# Patient Record
Sex: FEMALE | Race: WHITE | NOT HISPANIC OR LATINO
[De-identification: names, ages, dates, MRNs, and addresses within clinical notes are randomized per-mention and may not be internally consistent; named-entity substitution may affect disease eponyms.]

---

## 2008-11-20 RX ORDER — QUETIAPINE FUMARATE 200 MG/1
1 TABLET, FILM COATED ORAL
Qty: 0 | Refills: 0 | COMMUNITY
Start: 2008-11-20

## 2008-11-20 RX ORDER — ALPRAZOLAM 0.25 MG
1 TABLET ORAL
Qty: 0 | Refills: 0 | COMMUNITY
Start: 2008-11-20

## 2013-11-20 RX ORDER — CLONAZEPAM 1 MG
0.5 TABLET ORAL
Qty: 0 | Refills: 0 | COMMUNITY
Start: 2013-11-20

## 2018-02-13 PROBLEM — Z00.00 ENCOUNTER FOR PREVENTIVE HEALTH EXAMINATION: Noted: 2018-02-13

## 2018-02-28 ENCOUNTER — APPOINTMENT (OUTPATIENT)
Dept: FAMILY MEDICINE | Facility: CLINIC | Age: 38
End: 2018-02-28
Payer: MEDICAID

## 2018-02-28 VITALS
WEIGHT: 139.31 LBS | DIASTOLIC BLOOD PRESSURE: 70 MMHG | RESPIRATION RATE: 16 BRPM | BODY MASS INDEX: 21.87 KG/M2 | SYSTOLIC BLOOD PRESSURE: 108 MMHG | HEIGHT: 67 IN | HEART RATE: 83 BPM | OXYGEN SATURATION: 97 %

## 2018-02-28 DIAGNOSIS — F41.8 OTHER SPECIFIED ANXIETY DISORDERS: ICD-10-CM

## 2018-02-28 DIAGNOSIS — M25.512 PAIN IN LEFT SHOULDER: ICD-10-CM

## 2018-02-28 PROCEDURE — 99204 OFFICE O/P NEW MOD 45 MIN: CPT

## 2018-02-28 RX ORDER — NORGESTIMATE AND ETHINYL ESTRADIOL 7DAYSX3 28
0.18/0.215/0.25 KIT ORAL
Qty: 28 | Refills: 0 | Status: DISCONTINUED | COMMUNITY
Start: 2018-01-17 | End: 2018-02-28

## 2018-03-01 PROBLEM — F41.8 SITUATIONAL ANXIETY: Status: ACTIVE | Noted: 2018-03-01

## 2018-03-02 ENCOUNTER — APPOINTMENT (OUTPATIENT)
Dept: FAMILY MEDICINE | Facility: CLINIC | Age: 38
End: 2018-03-02

## 2018-03-12 LAB
25(OH)D3 SERPL-MCNC: 47.5 NG/ML
ALBUMIN SERPL ELPH-MCNC: 4.4 G/DL
ALP BLD-CCNC: 46 U/L
ALT SERPL-CCNC: 14 U/L
ANION GAP SERPL CALC-SCNC: 18 MMOL/L
AST SERPL-CCNC: 29 U/L
BASOPHILS # BLD AUTO: 0.06 K/UL
BASOPHILS NFR BLD AUTO: 0.7 %
BILIRUB SERPL-MCNC: 0.5 MG/DL
BUN SERPL-MCNC: 18 MG/DL
CALCIUM SERPL-MCNC: 9.3 MG/DL
CHLORIDE SERPL-SCNC: 102 MMOL/L
CHOLEST SERPL-MCNC: 200 MG/DL
CHOLEST/HDLC SERPL: 3.5 RATIO
CO2 SERPL-SCNC: 24 MMOL/L
CREAT SERPL-MCNC: 0.95 MG/DL
DHEA SERPL-MCNC: 318 NG/DL
EOSINOPHIL # BLD AUTO: 0.64 K/UL
EOSINOPHIL NFR BLD AUTO: 7.4 %
FOLATE SERPL-MCNC: >20 NG/ML
GLUCOSE SERPL-MCNC: 92 MG/DL
HBA1C MFR BLD HPLC: 5.2 %
HCT VFR BLD CALC: 43.5 %
HDLC SERPL-MCNC: 57 MG/DL
HGB BLD-MCNC: 14.5 G/DL
IMM GRANULOCYTES NFR BLD AUTO: 0.2 %
LDLC SERPL CALC-MCNC: 134 MG/DL
LYMPHOCYTES # BLD AUTO: 3.17 K/UL
LYMPHOCYTES NFR BLD AUTO: 36.5 %
MAN DIFF?: NORMAL
MCHC RBC-ENTMCNC: 32.4 PG
MCHC RBC-ENTMCNC: 33.3 GM/DL
MCV RBC AUTO: 97.1 FL
MONOCYTES # BLD AUTO: 0.46 K/UL
MONOCYTES NFR BLD AUTO: 5.3 %
NEUTROPHILS # BLD AUTO: 4.34 K/UL
NEUTROPHILS NFR BLD AUTO: 49.9 %
PLATELET # BLD AUTO: 269 K/UL
POTASSIUM SERPL-SCNC: 4.3 MMOL/L
PROT SERPL-MCNC: 7 G/DL
RBC # BLD: 4.48 M/UL
RBC # FLD: 13.2 %
SODIUM SERPL-SCNC: 144 MMOL/L
T3FREE SERPL-MCNC: 2.48 PG/ML
T4 FREE SERPL-MCNC: 1.2 NG/DL
TESTOST BND SERPL-MCNC: 3.5 PG/ML
TESTOST SERPL-MCNC: 34.6 NG/DL
TRIGL SERPL-MCNC: 45 MG/DL
TSH SERPL-ACNC: 2.92 UIU/ML
VIT B12 SERPL-MCNC: 798 PG/ML
WBC # FLD AUTO: 8.69 K/UL

## 2018-03-14 ENCOUNTER — APPOINTMENT (OUTPATIENT)
Dept: FAMILY MEDICINE | Facility: CLINIC | Age: 38
End: 2018-03-14

## 2018-03-21 ENCOUNTER — APPOINTMENT (OUTPATIENT)
Dept: FAMILY MEDICINE | Facility: CLINIC | Age: 38
End: 2018-03-21

## 2018-03-28 ENCOUNTER — APPOINTMENT (OUTPATIENT)
Dept: FAMILY MEDICINE | Facility: CLINIC | Age: 38
End: 2018-03-28
Payer: MEDICAID

## 2018-03-28 VITALS
BODY MASS INDEX: 21.58 KG/M2 | RESPIRATION RATE: 14 BRPM | HEART RATE: 80 BPM | HEIGHT: 67 IN | OXYGEN SATURATION: 97 % | SYSTOLIC BLOOD PRESSURE: 104 MMHG | DIASTOLIC BLOOD PRESSURE: 72 MMHG | WEIGHT: 137.5 LBS

## 2018-03-28 DIAGNOSIS — M75.81 OTHER SHOULDER LESIONS, RIGHT SHOULDER: ICD-10-CM

## 2018-03-28 DIAGNOSIS — M25.511 PAIN IN RIGHT SHOULDER: ICD-10-CM

## 2018-03-28 PROCEDURE — 99214 OFFICE O/P EST MOD 30 MIN: CPT

## 2018-04-01 ENCOUNTER — OUTPATIENT (OUTPATIENT)
Dept: OUTPATIENT SERVICES | Facility: HOSPITAL | Age: 38
LOS: 1 days | End: 2018-04-01
Payer: MEDICAID

## 2018-04-01 DIAGNOSIS — M67.912 UNSPECIFIED DISORDER OF SYNOVIUM AND TENDON, LEFT SHOULDER: Chronic | ICD-10-CM

## 2018-04-01 PROCEDURE — G9001: CPT

## 2018-04-18 ENCOUNTER — APPOINTMENT (OUTPATIENT)
Dept: FAMILY MEDICINE | Facility: CLINIC | Age: 38
End: 2018-04-18

## 2018-04-19 DIAGNOSIS — R69 ILLNESS, UNSPECIFIED: ICD-10-CM

## 2018-05-09 ENCOUNTER — APPOINTMENT (OUTPATIENT)
Dept: FAMILY MEDICINE | Facility: CLINIC | Age: 38
End: 2018-05-09

## 2018-05-16 ENCOUNTER — RX RENEWAL (OUTPATIENT)
Age: 38
End: 2018-05-16

## 2018-06-04 ENCOUNTER — APPOINTMENT (OUTPATIENT)
Dept: FAMILY MEDICINE | Facility: CLINIC | Age: 38
End: 2018-06-04
Payer: MEDICAID

## 2018-06-04 VITALS
DIASTOLIC BLOOD PRESSURE: 80 MMHG | RESPIRATION RATE: 12 BRPM | HEIGHT: 67 IN | SYSTOLIC BLOOD PRESSURE: 130 MMHG | BODY MASS INDEX: 23.56 KG/M2 | HEART RATE: 76 BPM | OXYGEN SATURATION: 99 % | WEIGHT: 150.13 LBS

## 2018-06-04 DIAGNOSIS — N91.2 AMENORRHEA, UNSPECIFIED: ICD-10-CM

## 2018-06-04 PROCEDURE — 99214 OFFICE O/P EST MOD 30 MIN: CPT | Mod: 25

## 2018-06-04 PROCEDURE — 81025 URINE PREGNANCY TEST: CPT

## 2018-06-05 LAB
HCG UR QL: NEGATIVE
QUALITY CONTROL: YES

## 2018-06-07 NOTE — ASSESSMENT
[FreeTextEntry1] : medications renewed\par urine pregnancy test negative\par RTO prn and for f/u 3 months

## 2018-06-07 NOTE — PHYSICAL EXAM
[No Acute Distress] : no acute distress [Well Nourished] : well nourished [Well Developed] : well developed [Well-Appearing] : well-appearing [Normal Sclera/Conjunctiva] : normal sclera/conjunctiva [No Respiratory Distress] : no respiratory distress  [Clear to Auscultation] : lungs were clear to auscultation bilaterally [No Accessory Muscle Use] : no accessory muscle use [Normal Rate] : normal rate  [Regular Rhythm] : with a regular rhythm [Normal S1, S2] : normal S1 and S2
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
## 2018-07-20 ENCOUNTER — RX RENEWAL (OUTPATIENT)
Age: 38
End: 2018-07-20

## 2018-08-08 ENCOUNTER — APPOINTMENT (OUTPATIENT)
Dept: FAMILY MEDICINE | Facility: CLINIC | Age: 38
End: 2018-08-08

## 2018-08-08 ENCOUNTER — APPOINTMENT (OUTPATIENT)
Dept: FAMILY MEDICINE | Facility: CLINIC | Age: 38
End: 2018-08-08
Payer: MEDICAID

## 2018-08-08 VITALS
HEIGHT: 67 IN | BODY MASS INDEX: 20.51 KG/M2 | HEART RATE: 87 BPM | WEIGHT: 130.7 LBS | SYSTOLIC BLOOD PRESSURE: 111 MMHG | DIASTOLIC BLOOD PRESSURE: 80 MMHG

## 2018-08-08 DIAGNOSIS — E78.00 PURE HYPERCHOLESTEROLEMIA, UNSPECIFIED: ICD-10-CM

## 2018-08-08 PROCEDURE — 99214 OFFICE O/P EST MOD 30 MIN: CPT

## 2018-08-08 NOTE — HISTORY OF PRESENT ILLNESS
[FreeTextEntry1] : Patient presents for f/u appointment. Taking meds regularly (secretly from boyfriend, takes 25 mg quetiapine (breaks in half) nightly), Saw [facility] today, has started with new counselor in [place] whom she feels is helping her. Her 10 yo daughter is here from [facility], and will stay with her now. She is managing taking care of her daughter well, boyfriend and family helping with daughter. Feels better, however is anxious more often. Restarted her supplements; see below. \par \par ROS: negative except as noted above\par \par \par supplements: LIVIA 1500 mg daily, lithium orotate 240 mg qd, ashwagandha one daily, holy basil, chi'll out (chinese herb blend), b complex with zinc 25 mg, vit D, vit C 2000, mvi, calcium 1200, inositol 1000 mg, glycine 500 mg, hyaluronic acid 200 mg, echinacea 600 mg, queracetin 500 mg, NAC, bromelian 500-1500 mg, butterbur extract 50 mg, singing nettle 300 mg, reishi mushroom 900 mg, vit E 400 mg, ginko 125, dhea 100 mg, co Q 10 400 mg, iron 65 mg, iodine/potassium 12.5 mg, ALA 1200 mg, lysine 1000 mg, cayenne 1000 mg, garlic, collagen, L carnitine, raw probiotics, fish oil, turmeric, william

## 2018-08-08 NOTE — PHYSICAL EXAM
[No Acute Distress] : no acute distress [Well Nourished] : well nourished [Well Developed] : well developed [Well-Appearing] : well-appearing [Normal Voice/Communication] : normal voice/communication [No Respiratory Distress] : no respiratory distress  [Clear to Auscultation] : lungs were clear to auscultation bilaterally [No Accessory Muscle Use] : no accessory muscle use [Normal Rate] : normal rate  [Regular Rhythm] : with a regular rhythm [Normal S1, S2] : normal S1 and S2 [Normal Gait] : normal gait

## 2018-09-12 ENCOUNTER — RX RENEWAL (OUTPATIENT)
Age: 38
End: 2018-09-12

## 2018-09-24 ENCOUNTER — APPOINTMENT (OUTPATIENT)
Dept: FAMILY MEDICINE | Facility: CLINIC | Age: 38
End: 2018-09-24
Payer: MEDICAID

## 2018-09-24 VITALS
SYSTOLIC BLOOD PRESSURE: 100 MMHG | HEIGHT: 67 IN | BODY MASS INDEX: 21.03 KG/M2 | WEIGHT: 134 LBS | RESPIRATION RATE: 14 BRPM | TEMPERATURE: 98.3 F | DIASTOLIC BLOOD PRESSURE: 68 MMHG | OXYGEN SATURATION: 97 % | HEART RATE: 97 BPM

## 2018-09-24 DIAGNOSIS — B27.90 INFECTIOUS MONONUCLEOSIS, UNSPECIFIED W/OUT COMPLICATION: ICD-10-CM

## 2018-09-24 PROCEDURE — 99214 OFFICE O/P EST MOD 30 MIN: CPT

## 2018-09-24 NOTE — PHYSICAL EXAM
[No Acute Distress] : no acute distress [Well Nourished] : well nourished [Well Developed] : well developed [Normal Voice/Communication] : normal voice/communication [Normal Sclera/Conjunctiva] : normal sclera/conjunctiva [Supple] : supple [No Respiratory Distress] : no respiratory distress  [Clear to Auscultation] : lungs were clear to auscultation bilaterally [No Accessory Muscle Use] : no accessory muscle use [Normal Rate] : normal rate  [Regular Rhythm] : with a regular rhythm [Normal S1, S2] : normal S1 and S2 [Soft] : abdomen soft [Non-distended] : non-distended [No Masses] : no abdominal mass palpated [Normal Bowel Sounds] : normal bowel sounds [de-identified] : +mildly tender over left upper quadrant, no rebound, no guarding

## 2018-09-24 NOTE — ASSESSMENT
[FreeTextEntry1] : blood work reviewed with patient in office\par patient wants to return to holistic doctor for further vit C infusion\par increase fluids, increase rest, avoid contact sports, don't push yourself to exercise/exertion, make sure to rest\par no sick contacts at home; daughter living with her and boyfriend now, doing well\par RTO if you feel worse or do not feel better

## 2018-09-24 NOTE — HISTORY OF PRESENT ILLNESS
[FreeTextEntry8] : Went 9/20 for several  "pr[facility]" [facility] blood work, 8/27 blood work done. Exhausted. vit c drip, vit D, B 12 shot received in that office. Told she has mono. Was started on [place] thyroid by that doctor as well, started armour thyroid yesterday. \par \par armour thyroid 30 mg daily bid, last dose 3 pm; started yesterday

## 2018-10-01 ENCOUNTER — APPOINTMENT (OUTPATIENT)
Dept: ULTRASOUND IMAGING | Facility: HOSPITAL | Age: 38
End: 2018-10-01
Payer: MEDICAID

## 2018-10-01 ENCOUNTER — OUTPATIENT (OUTPATIENT)
Dept: OUTPATIENT SERVICES | Facility: HOSPITAL | Age: 38
LOS: 1 days | End: 2018-10-01
Payer: MEDICAID

## 2018-10-01 DIAGNOSIS — M67.912 UNSPECIFIED DISORDER OF SYNOVIUM AND TENDON, LEFT SHOULDER: Chronic | ICD-10-CM

## 2018-10-01 DIAGNOSIS — R10.11 RIGHT UPPER QUADRANT PAIN: ICD-10-CM

## 2018-10-01 PROCEDURE — 76700 US EXAM ABDOM COMPLETE: CPT | Mod: 26

## 2018-10-01 PROCEDURE — 76700 US EXAM ABDOM COMPLETE: CPT

## 2018-10-11 ENCOUNTER — RX RENEWAL (OUTPATIENT)
Age: 38
End: 2018-10-11

## 2018-10-19 RX ORDER — QUETIAPINE FUMARATE 50 MG/1
50 TABLET ORAL
Qty: 30 | Refills: 3 | Status: DISCONTINUED | COMMUNITY
Start: 2018-02-28 | End: 2018-10-19

## 2018-10-20 ENCOUNTER — EMERGENCY (EMERGENCY)
Facility: HOSPITAL | Age: 38
LOS: 1 days | Discharge: HOME | End: 2018-10-20
Attending: EMERGENCY MEDICINE | Admitting: EMERGENCY MEDICINE
Payer: MEDICAID

## 2018-10-20 VITALS
TEMPERATURE: 96 F | SYSTOLIC BLOOD PRESSURE: 91 MMHG | DIASTOLIC BLOOD PRESSURE: 60 MMHG | HEIGHT: 67 IN | WEIGHT: 130.07 LBS | HEART RATE: 75 BPM | RESPIRATION RATE: 16 BRPM

## 2018-10-20 VITALS
RESPIRATION RATE: 17 BRPM | SYSTOLIC BLOOD PRESSURE: 105 MMHG | OXYGEN SATURATION: 99 % | TEMPERATURE: 98 F | DIASTOLIC BLOOD PRESSURE: 66 MMHG | HEART RATE: 80 BPM

## 2018-10-20 DIAGNOSIS — R05 COUGH: ICD-10-CM

## 2018-10-20 DIAGNOSIS — M67.912 UNSPECIFIED DISORDER OF SYNOVIUM AND TENDON, LEFT SHOULDER: Chronic | ICD-10-CM

## 2018-10-20 LAB
ALBUMIN SERPL ELPH-MCNC: 3.7 G/DL — SIGNIFICANT CHANGE UP (ref 3.3–5)
ALP SERPL-CCNC: 45 U/L — SIGNIFICANT CHANGE UP (ref 40–120)
ALT FLD-CCNC: 21 U/L DA — SIGNIFICANT CHANGE UP (ref 10–45)
ANION GAP SERPL CALC-SCNC: 12 MMOL/L — SIGNIFICANT CHANGE UP (ref 5–17)
AST SERPL-CCNC: 17 U/L — SIGNIFICANT CHANGE UP (ref 10–40)
BILIRUB SERPL-MCNC: 0.3 MG/DL — SIGNIFICANT CHANGE UP (ref 0.2–1.2)
BUN SERPL-MCNC: 16 MG/DL — SIGNIFICANT CHANGE UP (ref 7–23)
CALCIUM SERPL-MCNC: 8.6 MG/DL — SIGNIFICANT CHANGE UP (ref 8.4–10.5)
CHLORIDE SERPL-SCNC: 104 MMOL/L — SIGNIFICANT CHANGE UP (ref 96–108)
CO2 SERPL-SCNC: 26 MMOL/L — SIGNIFICANT CHANGE UP (ref 22–31)
CREAT SERPL-MCNC: 0.83 MG/DL — SIGNIFICANT CHANGE UP (ref 0.5–1.3)
D DIMER BLD IA.RAPID-MCNC: <150 NG/ML DDU — SIGNIFICANT CHANGE UP
GLUCOSE SERPL-MCNC: 91 MG/DL — SIGNIFICANT CHANGE UP (ref 70–99)
HCT VFR BLD CALC: 43.4 % — SIGNIFICANT CHANGE UP (ref 34.5–45)
HGB BLD-MCNC: 14.8 G/DL — SIGNIFICANT CHANGE UP (ref 11.5–15.5)
INR BLD: 1 RATIO — SIGNIFICANT CHANGE UP (ref 0.88–1.16)
MCHC RBC-ENTMCNC: 33.5 PG — SIGNIFICANT CHANGE UP (ref 27–34)
MCHC RBC-ENTMCNC: 34.2 GM/DL — SIGNIFICANT CHANGE UP (ref 32–36)
MCV RBC AUTO: 98 FL — SIGNIFICANT CHANGE UP (ref 80–100)
PLATELET # BLD AUTO: 282 K/UL — SIGNIFICANT CHANGE UP (ref 150–400)
POTASSIUM SERPL-MCNC: 4.4 MMOL/L — SIGNIFICANT CHANGE UP (ref 3.5–5.3)
POTASSIUM SERPL-SCNC: 4.4 MMOL/L — SIGNIFICANT CHANGE UP (ref 3.5–5.3)
PROT SERPL-MCNC: 6.8 G/DL — SIGNIFICANT CHANGE UP (ref 6–8.3)
PROTHROM AB SERPL-ACNC: 11.1 SEC — SIGNIFICANT CHANGE UP (ref 9.8–12.7)
RBC # BLD: 4.43 M/UL — SIGNIFICANT CHANGE UP (ref 3.8–5.2)
RBC # FLD: 12.8 % — SIGNIFICANT CHANGE UP (ref 10.3–14.5)
SODIUM SERPL-SCNC: 142 MMOL/L — SIGNIFICANT CHANGE UP (ref 135–145)
TROPONIN I SERPL-MCNC: <.017 NG/ML — LOW (ref 0.02–0.06)
WBC # BLD: 10.1 K/UL — SIGNIFICANT CHANGE UP (ref 3.8–10.5)
WBC # FLD AUTO: 10.1 K/UL — SIGNIFICANT CHANGE UP (ref 3.8–10.5)

## 2018-10-20 PROCEDURE — 93005 ELECTROCARDIOGRAM TRACING: CPT

## 2018-10-20 PROCEDURE — 96360 HYDRATION IV INFUSION INIT: CPT

## 2018-10-20 PROCEDURE — 84484 ASSAY OF TROPONIN QUANT: CPT

## 2018-10-20 PROCEDURE — 85379 FIBRIN DEGRADATION QUANT: CPT

## 2018-10-20 PROCEDURE — 99285 EMERGENCY DEPT VISIT HI MDM: CPT

## 2018-10-20 PROCEDURE — 85027 COMPLETE CBC AUTOMATED: CPT

## 2018-10-20 PROCEDURE — 85610 PROTHROMBIN TIME: CPT

## 2018-10-20 PROCEDURE — 80053 COMPREHEN METABOLIC PANEL: CPT

## 2018-10-20 PROCEDURE — 93010 ELECTROCARDIOGRAM REPORT: CPT

## 2018-10-20 PROCEDURE — 71046 X-RAY EXAM CHEST 2 VIEWS: CPT

## 2018-10-20 PROCEDURE — 99284 EMERGENCY DEPT VISIT MOD MDM: CPT | Mod: 25

## 2018-10-20 PROCEDURE — 94640 AIRWAY INHALATION TREATMENT: CPT

## 2018-10-20 PROCEDURE — 71046 X-RAY EXAM CHEST 2 VIEWS: CPT | Mod: 26

## 2018-10-20 RX ORDER — SODIUM CHLORIDE 9 MG/ML
1000 INJECTION INTRAMUSCULAR; INTRAVENOUS; SUBCUTANEOUS ONCE
Qty: 0 | Refills: 0 | Status: COMPLETED | OUTPATIENT
Start: 2018-10-20 | End: 2018-10-20

## 2018-10-20 RX ORDER — AZITHROMYCIN 500 MG/1
500 TABLET, FILM COATED ORAL ONCE
Qty: 0 | Refills: 0 | Status: COMPLETED | OUTPATIENT
Start: 2018-10-20 | End: 2018-10-20

## 2018-10-20 RX ORDER — ALBUTEROL 90 UG/1
2 AEROSOL, METERED ORAL
Qty: 1 | Refills: 0 | OUTPATIENT
Start: 2018-10-20 | End: 2018-10-26

## 2018-10-20 RX ORDER — AZITHROMYCIN 500 MG/1
1 TABLET, FILM COATED ORAL
Qty: 4 | Refills: 0 | OUTPATIENT
Start: 2018-10-20 | End: 2018-10-23

## 2018-10-20 RX ORDER — IPRATROPIUM/ALBUTEROL SULFATE 18-103MCG
3 AEROSOL WITH ADAPTER (GRAM) INHALATION ONCE
Qty: 0 | Refills: 0 | Status: COMPLETED | OUTPATIENT
Start: 2018-10-20 | End: 2018-10-20

## 2018-10-20 RX ADMIN — SODIUM CHLORIDE 1000 MILLILITER(S): 9 INJECTION INTRAMUSCULAR; INTRAVENOUS; SUBCUTANEOUS at 20:48

## 2018-10-20 RX ADMIN — Medication 3 MILLILITER(S): at 19:50

## 2018-10-20 RX ADMIN — AZITHROMYCIN 500 MILLIGRAM(S): 500 TABLET, FILM COATED ORAL at 20:13

## 2018-10-20 RX ADMIN — SODIUM CHLORIDE 2000 MILLILITER(S): 9 INJECTION INTRAMUSCULAR; INTRAVENOUS; SUBCUTANEOUS at 19:50

## 2018-10-20 NOTE — ED PROVIDER NOTE - PLAN OF CARE
Follow up with your PMD within 48-72 hours.  Take copies of your reports given to you. Rest, increase fluids. Take Tylenol 650mg every 4-6 hours for pain or temp greater than 99.9. Take Zithromax 250mg daily for 4 more days. Albuterol 2 inhalations every 4-6 hours as needed for cough. Worsening, continued or ANY new concerning symptoms return to the emergency department.

## 2018-10-20 NOTE — ED PROVIDER NOTE - ATTENDING CONTRIBUTION TO CARE
Dr. Granados: I performed a face to face bedside interview with patient regarding history of present illness, review of symptoms and past medical history. I completed an independent physical exam.  I have discussed patient's plan of care with PA.   I agree with note as stated above, having amended the EMR as needed to reflect my findings.   This includes HISTORY OF PRESENT ILLNESS, HIV, PAST MEDICAL/SURGICAL/FAMILY/SOCIAL HISTORY, ALLERGIES AND HOME MEDICATIONS, REVIEW OF SYSTEMS, PHYSICAL EXAM, and any PROGRESS NOTES during the time I functioned as the attending physician for this patient.  VITALS: reviewed  GEN: NAD, A & O x 4  HEAD/EYES: NCAT, PERRL, EOMI, anicteric sclerae, no conjunctival pallor  ENT: mucus membranes moist, oropharynx WNL, trachea midline, no JVD  CHEST: lungs with exp wheezing and bibasilar crackles  CV: heart with reg rhythm S1, S2, no murmur; distal pulses intact and symmetric bilaterally  ABDOMEN: normoactive bowel sounds, soft, ND, nontender, no masses  : no CVAT, no suprapubic tenderness or fullness  MSK: extremities atraumatic and nontender, no edema. back is without midline tenderness, deformity or stepoff and is ranged painlessly. the neck has no midline tenderness, deformity, or stepoff, and is ranged painlessly.  SKIN: no rash, no bruising, no cyanosis. color appropriate for ethnicity  NEURO: alert, mentating appropriately, no facial asymmetry. gross sensation, motor, coordination are intact  PSYCH: Affect appropriate

## 2018-10-20 NOTE — ED ADULT NURSE NOTE - NSIMPLEMENTINTERV_GEN_ALL_ED
Implemented All Fall with Harm Risk Interventions:  [place] to call system. Call bell, personal items and telephone within reach. Instruct patient to call for assistance. Room bathroom lighting operational. Non-slip footwear when patient is off stretcher. Physically safe environment: no spills, clutter or unnecessary equipment. Stretcher in lowest position, wheels locked, appropriate side rails in place. Provide visual cue, wrist band, yellow gown, etc. Monitor gait and stability. Monitor for mental status changes and reorient to person, place, and time. Review medications for side effects contributing to fall risk. Reinforce activity limits and safety measures with patient and family. Provide visual clues: red socks.

## 2018-10-20 NOTE — ED PROVIDER NOTE - CARE PLAN
Principal Discharge DX:	Bronchitis  Assessment and plan of treatment:	Follow up with your PMD within 48-72 hours.  Take copies of your reports given to you. Rest, increase fluids. Take Tylenol 650mg every 4-6 hours for pain or temp greater than 99.9. Take Zithromax 250mg daily for 4 more days. Albuterol 2 inhalations every 4-6 hours as needed for cough. Worsening, continued or ANY new concerning symptoms return to the emergency department.  Secondary Diagnosis:	Near syncope

## 2018-10-20 NOTE — ED PROVIDER NOTE - MEDICAL DECISION MAKING DETAILS
37 y/o F diagnosed with Mono 8/27 presents with cough, fever x 2 weeks (fever resolved) and now shortness of breath, chest pressure, palpiation x 4 days since starting a growth factor daily injections with near syncope episode last night- EKG, Labs with Trop, CXR, Fluids, reassess 39 y/o F diagnosed with Mono 8/27 presents with cough, fever x 2 weeks (fever resolved) and now shortness of breath, chest pressure, palpitations x 4 days since starting a growth factor daily injections with near syncope episode last night- EKG, Labs with Trop, CXR, Fluids, reassess

## 2018-10-20 NOTE — ED PROVIDER NOTE - PROGRESS NOTE DETAILS
EKG WNL, BW WNL, UCG negative, CXR- NAPF. Feeling better s/p neb/ Will DC home on Zithromax/Albuterol with PMD follow up. Discussed stopping growth hormone injections. Copies of all reports given to pt.

## 2018-10-20 NOTE — ED ADULT NURSE NOTE - OBJECTIVE STATEMENT
pt complaining of cough and fever for 2 weeks. pt reports she started taking IGF 4 days ago and started feeling palpitations, chest pressure and sob. pt reports generalized weakness

## 2018-10-20 NOTE — ED PROVIDER NOTE - OBJECTIVE STATEMENT
39 y/o F no pmh presents with cough, fever x 2 weeks and near syncope episode last night.  States she was diagnosed with Mononucleosis 8/27 by PMD [facility]. States fatigue and generalized weakness since. States 2 weeks ago started with cough and fever Tmax 101 that broke 2 days ago but left with cough. Also states she started IGF1 (growth factor) 4 days ago. Since then states chest pressure, palpitations and shortness of breath. While walking with  last night states she had a near syncope episode- did not take the growth hormone factor last night. Currently appears well, speaking in full sentences, no distress.

## 2018-10-22 NOTE — ED POST DISCHARGE NOTE - RESULT SUMMARY
Pt called stating that she received a call from someone in the ED a few hours ago stating that her pregnancy test was positive. after reviewing the chart flowsheet states pregnancy test is negative and no post discharge note not entered. lengthy discussion with pt regarding negative results

## 2018-10-25 RX ORDER — FLUTICASONE PROPIONATE 110 UG/1
110 AEROSOL, METERED RESPIRATORY (INHALATION) TWICE DAILY
Qty: 1 | Refills: 2 | Status: ACTIVE | COMMUNITY
Start: 2018-10-25 | End: 1900-01-01

## 2018-11-07 ENCOUNTER — APPOINTMENT (OUTPATIENT)
Dept: FAMILY MEDICINE | Facility: CLINIC | Age: 38
End: 2018-11-07

## 2018-11-20 ENCOUNTER — EMERGENCY (EMERGENCY)
Facility: HOSPITAL | Age: 38
LOS: 1 days | Discharge: HOME | End: 2018-11-20
Attending: EMERGENCY MEDICINE
Payer: MEDICAID

## 2018-11-20 VITALS
RESPIRATION RATE: 18 BRPM | SYSTOLIC BLOOD PRESSURE: 103 MMHG | DIASTOLIC BLOOD PRESSURE: 72 MMHG | OXYGEN SATURATION: 98 % | TEMPERATURE: 99 F | HEART RATE: 96 BPM

## 2018-11-20 VITALS — WEIGHT: 134.92 LBS | HEIGHT: 67 IN

## 2018-11-20 DIAGNOSIS — M67.912 UNSPECIFIED DISORDER OF SYNOVIUM AND TENDON, LEFT SHOULDER: Chronic | ICD-10-CM

## 2018-11-20 DIAGNOSIS — F31.9 BIPOLAR DISORDER, UNSPECIFIED: ICD-10-CM

## 2018-11-20 DIAGNOSIS — F10.10 ALCOHOL ABUSE, UNCOMPLICATED: ICD-10-CM

## 2018-11-20 LAB
ALBUMIN SERPL ELPH-MCNC: 4.4 G/DL — SIGNIFICANT CHANGE UP (ref 3.3–5.2)
ALP SERPL-CCNC: 63 U/L — SIGNIFICANT CHANGE UP (ref 40–120)
ALT FLD-CCNC: 13 U/L — SIGNIFICANT CHANGE UP
AMPHET UR-MCNC: NEGATIVE — SIGNIFICANT CHANGE UP
ANION GAP SERPL CALC-SCNC: 13 MMOL/L — SIGNIFICANT CHANGE UP (ref 5–17)
APPEARANCE UR: CLEAR — SIGNIFICANT CHANGE UP
AST SERPL-CCNC: 22 U/L — SIGNIFICANT CHANGE UP
BARBITURATES UR SCN-MCNC: NEGATIVE — SIGNIFICANT CHANGE UP
BASOPHILS # BLD AUTO: 0.1 K/UL — SIGNIFICANT CHANGE UP (ref 0–0.2)
BASOPHILS NFR BLD AUTO: 0.8 % — SIGNIFICANT CHANGE UP (ref 0–2)
BENZODIAZ UR-MCNC: POSITIVE
BILIRUB SERPL-MCNC: <0.2 MG/DL — LOW (ref 0.4–2)
BILIRUB UR-MCNC: NEGATIVE — SIGNIFICANT CHANGE UP
BUN SERPL-MCNC: 24 MG/DL — HIGH (ref 8–20)
CALCIUM SERPL-MCNC: 8.3 MG/DL — LOW (ref 8.6–10.2)
CHLORIDE SERPL-SCNC: 104 MMOL/L — SIGNIFICANT CHANGE UP (ref 98–107)
CO2 SERPL-SCNC: 23 MMOL/L — SIGNIFICANT CHANGE UP (ref 22–29)
COCAINE METAB.OTHER UR-MCNC: NEGATIVE — SIGNIFICANT CHANGE UP
COLOR SPEC: YELLOW — SIGNIFICANT CHANGE UP
CREAT SERPL-MCNC: 0.68 MG/DL — SIGNIFICANT CHANGE UP (ref 0.5–1.3)
DIFF PNL FLD: NEGATIVE — SIGNIFICANT CHANGE UP
EOSINOPHIL # BLD AUTO: 0.2 K/UL — SIGNIFICANT CHANGE UP (ref 0–0.5)
EOSINOPHIL NFR BLD AUTO: 1.8 % — SIGNIFICANT CHANGE UP (ref 0–6)
ETHANOL SERPL-MCNC: 263 MG/DL — SIGNIFICANT CHANGE UP
GLUCOSE SERPL-MCNC: 95 MG/DL — SIGNIFICANT CHANGE UP (ref 70–115)
GLUCOSE UR QL: NEGATIVE MG/DL — SIGNIFICANT CHANGE UP
HCG UR QL: NEGATIVE — SIGNIFICANT CHANGE UP
HCT VFR BLD CALC: 43.1 % — SIGNIFICANT CHANGE UP (ref 37–47)
HGB BLD-MCNC: 14.5 G/DL — SIGNIFICANT CHANGE UP (ref 12–16)
KETONES UR-MCNC: NEGATIVE — SIGNIFICANT CHANGE UP
LEUKOCYTE ESTERASE UR-ACNC: NEGATIVE — SIGNIFICANT CHANGE UP
LYMPHOCYTES # BLD AUTO: 4.1 K/UL — SIGNIFICANT CHANGE UP (ref 1–4.8)
LYMPHOCYTES # BLD AUTO: 46.3 % — SIGNIFICANT CHANGE UP (ref 20–55)
MCHC RBC-ENTMCNC: 33 PG — HIGH (ref 27–31)
MCHC RBC-ENTMCNC: 33.6 G/DL — SIGNIFICANT CHANGE UP (ref 32–36)
MCV RBC AUTO: 98.2 FL — SIGNIFICANT CHANGE UP (ref 81–99)
METHADONE UR-MCNC: NEGATIVE — SIGNIFICANT CHANGE UP
MONOCYTES # BLD AUTO: 0.4 K/UL — SIGNIFICANT CHANGE UP (ref 0–0.8)
MONOCYTES NFR BLD AUTO: 4.5 % — SIGNIFICANT CHANGE UP (ref 3–10)
NEUTROPHILS # BLD AUTO: 4.1 K/UL — SIGNIFICANT CHANGE UP (ref 1.8–8)
NEUTROPHILS NFR BLD AUTO: 46.1 % — SIGNIFICANT CHANGE UP (ref 37–73)
NITRITE UR-MCNC: NEGATIVE — SIGNIFICANT CHANGE UP
OPIATES UR-MCNC: NEGATIVE — SIGNIFICANT CHANGE UP
PCP SPEC-MCNC: SIGNIFICANT CHANGE UP
PCP UR-MCNC: NEGATIVE — SIGNIFICANT CHANGE UP
PH UR: 5 — SIGNIFICANT CHANGE UP (ref 5–8)
PLATELET # BLD AUTO: 247 K/UL — SIGNIFICANT CHANGE UP (ref 150–400)
POTASSIUM SERPL-MCNC: 4.3 MMOL/L — SIGNIFICANT CHANGE UP (ref 3.5–5.3)
POTASSIUM SERPL-SCNC: 4.3 MMOL/L — SIGNIFICANT CHANGE UP (ref 3.5–5.3)
PROT SERPL-MCNC: 7.1 G/DL — SIGNIFICANT CHANGE UP (ref 6.6–8.7)
PROT UR-MCNC: NEGATIVE MG/DL — SIGNIFICANT CHANGE UP
RBC # BLD: 4.39 M/UL — LOW (ref 4.4–5.2)
RBC # FLD: 13.6 % — SIGNIFICANT CHANGE UP (ref 11–15.6)
SODIUM SERPL-SCNC: 140 MMOL/L — SIGNIFICANT CHANGE UP (ref 135–145)
SP GR SPEC: 1.01 — SIGNIFICANT CHANGE UP (ref 1.01–1.02)
THC UR QL: NEGATIVE — SIGNIFICANT CHANGE UP
UROBILINOGEN FLD QL: NEGATIVE MG/DL — SIGNIFICANT CHANGE UP
WBC # BLD: 8.9 K/UL — SIGNIFICANT CHANGE UP (ref 4.8–10.8)
WBC # FLD AUTO: 8.9 K/UL — SIGNIFICANT CHANGE UP (ref 4.8–10.8)

## 2018-11-20 PROCEDURE — 81003 URINALYSIS AUTO W/O SCOPE: CPT

## 2018-11-20 PROCEDURE — 99284 EMERGENCY DEPT VISIT MOD MDM: CPT

## 2018-11-20 PROCEDURE — 81025 URINE PREGNANCY TEST: CPT

## 2018-11-20 PROCEDURE — 93005 ELECTROCARDIOGRAM TRACING: CPT

## 2018-11-20 PROCEDURE — 85027 COMPLETE CBC AUTOMATED: CPT

## 2018-11-20 PROCEDURE — 90792 PSYCH DIAG EVAL W/MED SRVCS: CPT

## 2018-11-20 PROCEDURE — 93010 ELECTROCARDIOGRAM REPORT: CPT

## 2018-11-20 PROCEDURE — 80053 COMPREHEN METABOLIC PANEL: CPT

## 2018-11-20 PROCEDURE — 36415 COLL VENOUS BLD VENIPUNCTURE: CPT

## 2018-11-20 PROCEDURE — 80307 DRUG TEST PRSMV CHEM ANLYZR: CPT

## 2018-11-20 NOTE — ED BEHAVIORAL HEALTH ASSESSMENT NOTE - DETAILS
na mother-depression aunt anxiety no family suicide case closed, called when mother sent daughter to school with allergy meds, teachers did not know what it was mother

## 2018-11-20 NOTE — ED BEHAVIORAL HEALTH ASSESSMENT NOTE - HPI (INCLUDE ILLNESS QUALITY, SEVERITY, DURATION, TIMING, CONTEXT, MODIFYING FACTORS, ASSOCIATED SIGNS AND SYMPTOMS)
38 year old woman, hx of bipolar disorder, no prior suicide attempts 3 prior psychiatric hospitalizations year [facility] , recently broke up with ana, has 10 yo daughter, works as , BIB parents due to excessive intoxication.  Patient reports sadness in context of being dumped by fidenise, however denies she was sad before this, states she is suffering from a "broken heart". She states she started drinking to self medicate, at times drinks up to half a bottle of alcohol , endorses withdrawal sx of waking up sweating in middle of night at times. She denies suicidal ideation and homicidal ideation. She denies AH, VH and paranoia. She endorses hx of manic episodes lasting days with elevated mood racing thoughts increased spending, decreased sleep, last occurring 1.5 years ago.  patient states "I know I need to drink less" states she is not particularly interested in directly checking into rehab, that it would have to be a high end rehab. Also not particularly interested in attending substance abuse groups.     Mother called [phone] Mother states patient has been depressed for the past month, lays in bed is depressed, doors and blinds are closed, is drinking excessively. Mother states she found 12 to 20 alcohol bottles lying around, Mother states 3 days ago patient was highly intoxicated to the point of falling off the chair was too drunk to even feed herself. Mother states yesterday patient fell off the chair, and fell out of bed 3 times. Mother states patient’s therapist brought patient to the hospital because she is “endangering herself”. Mother states patient has reported at times doubling up her medications to treat her distress. Mother states “we don’t know what to do”.

## 2018-11-20 NOTE — ED BEHAVIORAL HEALTH ASSESSMENT NOTE - OTHER
"sad" advised patient it can be fatal to mix benzodiazepines and ETOH, patient expressed understanding.

## 2018-11-20 NOTE — ED BEHAVIORAL HEALTH ASSESSMENT NOTE - NS ED BHA PLAN TR BH CONTACTED FT
Dr Shilpa lorenzana, Nabila Little; discussed patient's presentation  and recommendations for substance treatment with Dr. Lorenzana who agreed, left message with Nabila Little

## 2018-11-20 NOTE — ED STATDOCS - OBJECTIVE STATEMENT
39 y/o F pt with PMHx bipolar disorder presents to the ED with parents for drinking too much.  Pt notes her parents brought her into the ED for "drinking too much" for the past 3 weeks.  Pt admits to drinking because of a recent breakup, "drinking to ease the pain".  Per mother, pt has been drinking "bottles and bottles".  Last EtOH intake last night.  Pt spoke to therapist on the phone last week, and saw psychiatrist several weeks ago.  Pt has admitted herself to psychiatric facilities 4 times, last admission 2013.  Pt is currently being weened off xanax and being placed on clonopin.  Pt admits to vaping.  Denies SI.  No further acute complaints at this time.   Psychiatrist: Dr. Quintanilla  Therapist: Dr. Brown 37 y/o F pt with PMHx bipolar disorder presents to the ED with parents for drinking too much.  Pt notes her parents brought her into the ED for "drinking too much" for the past 3 weeks.  Pt admits to drinking because of a recent breakup, "drinking to ease the pain".  Per mother, pt has been drinking "bottles and bottles".  Last EtOH intake last night.  Pt spoke to therapist on the phone last week, and saw psychiatrist several weeks ago.  Pt has admitted herself to psychiatric facilities 4 times, last admission 2013.  Pt is currently being weened off xanax and being placed on clonopin.  Pt admits to vaping.  Denies SI.  No further acute complaints at this time.   Psychiatrist: Dr. Champion  Therapist: Dr. Brown 39 y/o F pt with PMHx bipolar disorder presents to the ED with parents for drinking too much.  Pt notes her parents brought her into the ED for "drinking too much" for the past 3 weeks.  Pt admits to drinking because of a recent breakup, "drinking to ease the pain".  Denies SI/HI. Per mother, pt has been drinking "bottles and bottles".  Last EtOH intake last night.  Pt spoke to therapist on the phone last week, and saw psychiatrist several weeks ago.  Pt has admitted herself to psychiatric facilities 4 times, last admission 2013.  Pt is currently being weened off xanax and being placed on clonopin.  Pt admits to vaping.  Denies SI.  No further acute complaints at this time.   Psychiatrist: Dr. Lorenzana  Therapist: Nabila Rodrigues

## 2018-11-20 NOTE — ED ADULT NURSE NOTE - NSIMPLEMENTINTERV_GEN_ALL_ED
Implemented All Universal Safety Interventions:  [place] to call system. Call bell, personal items and telephone within reach. Instruct patient to call for assistance. Room bathroom lighting operational. Non-slip footwear when patient is off stretcher. Physically safe environment: no spills, clutter or unnecessary equipment. Stretcher in lowest position, wheels locked, appropriate side rails in place.

## 2018-11-20 NOTE — ED STATDOCS - PROGRESS NOTE DETAILS
Case discussed with BH for pt's evaluation. BH evaluation complete, patient cleared for discharge, outpatient referrals made and explained to patient.

## 2018-11-20 NOTE — ED BEHAVIORAL HEALTH ASSESSMENT NOTE - RISK ASSESSMENT
Chronic risk due to hx of bipolar disorder and substance abuse. Acute risk due to excessive drinking and breakup. however has no personal or family hx of suicide attempts denies suicidal ideation and homicidal ideation, future oriented with plans to look for jobs, has dependent daughter, no guns in home, positive therapeutic alliance. Patient assessed to not be at imminent risk of harm to self or others.
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
## 2018-11-20 NOTE — ED BEHAVIORAL HEALTH ASSESSMENT NOTE - SUMMARY
2018      Ryan Mars MD    [facility]   [address] [place] [address]      RE: Lin Arce   MRN: [id number]   : 1957      Dear Dr. Mars:      We had the pleasure of seeing Ms. Lin Arce in our [facility] today for followup.  She is a pleasant 60-year-old female with pulmonary arterial hypertension secondary to her systemic lupus erythematosus, who is currently being managed with up-front combination therapy with Adcirca and Letairis.  She is on 40 mg of Adcirca and 10 mg of Letairis.      She returns for her followup. Ms. Arce has been feeling significantly better.  She is not having much exertional shortness of breath.  She was able to do a lot of cleaning activities at home this summer.  I would currently characterize her as NYHA functional class II.  She denies having any chest pain or pressure.  She has not had any lower extremity swelling.  She has no lightheadedness, dizziness or syncope.  She has no chest pain or pressure.  She has no PND or orthopnea.      CURRENT MEDICATIONS:   Current Outpatient Prescriptions   Medication Sig     ambrisentan (LETAIRIS) 10 MG tablet Take 1 tablet (10 mg) by mouth daily     Cetirizine HCl (ZYRTEC ALLERGY PO) Take by mouth daily     digoxin (LANOXIN) 125 MCG tablet TAKE ONE TABLET BY MOUTH ONCE DAILY     diphenhydrAMINE-acetaminophen (TYLENOL PM)  MG tablet Take 2 tablets by mouth At Bedtime     FLUoxetine (PROZAC) 20 MG capsule TAKE ONE CAPSULE BY MOUTH ONCE DAILY     folic acid (FOLVITE) 1 MG tablet Take 1 tablet (1 mg) by mouth daily     Folic Acid-B6-B12-D (RX ESSENTIALS ANTIDEPRESSANT PO)      furosemide (LASIX) 20 MG tablet Take 1 tablet (20 mg) by mouth as needed     magnesium oxide (MAG-OX) 400 MG tablet Take 1 tablet (400 mg) by mouth 2 times daily     potassium chloride (K-TAB,KLOR-CON) 10 MEQ tablet TAKE ONE TABLET BY MOUTH ONCE DAILY      tadalafil, PAH, (ADCIRCA) 20 MG TABS Take 2 tablets (40 mg) by mouth daily     TraMADol HCl (ULTRAM PO) Take 100 mg by mouth every 6 hours as needed for moderate to severe pain     zolpidem (AMBIEN) 5 MG tablet Take 1 tablet (5 mg) by mouth nightly as needed for sleep     No current facility-administered medications for this visit.         REVIEW OF SYSTEMS:  A detailed 10-point review of systems obtained as described in the History of Present Illness. All other systems were reviewed and are negative.      PHYSICAL EXAMINATION:  She was comfortable.  She was in no apparent distress.  /66 (BP Location: Left arm, Patient Position: Chair, Cuff Size: Adult Large)  Pulse 63  Ht 1.524 m (5')  Wt 77.7 kg (171 lb 3.2 oz)  SpO2 98%  BMI 33.44 kg/m2. She had no pallor, cyanosis or jaundice.  Neck exam revealed no JVD, thyromegaly or carotid bruit.  Cardiac auscultation revealed normal S1 and S2 with no murmur, rub or gallop.  Auscultation of the lungs revealed equal air entry on both sides with no added sounds.  Her abdomen was soft with normal bowel sounds, no tenderness, no rigidity, no guarding.  She had no focal neurological deficit.      She had a 6MWT today where she walked for 305 meters. This is better than lst year but less than her baseline. She had no significant desaturations.      NT-proBNP - 333    CBC RESULTS:   Recent Labs   Lab Test  07/16/18   1004   WBC  4.4   RBC  3.67*   HGB  11.3*   HCT  36.5   MCV  100   MCH  30.8   MCHC  31.0*   RDW  15.9*   PLT  140*     Recent Labs   Lab Test  07/16/18   1004  08/02/17   1122   NA  140  138   POTASSIUM  4.4  4.2   CHLORIDE  106  106   CO2  28  26   ANIONGAP  6  6   GLC  86  70   BUN  21  19   CR  0.97  0.73   SONDRA  8.7  8.7     Liver Function Studies -   Recent Labs   Lab Test  07/16/18   1004   PROTTOTAL  8.0   ALBUMIN  3.5   BILITOTAL  0.5   ALKPHOS  92   AST  27   ALT  14       Assessment and Plan:     In summary, Ms. Lin Arce is a very pleasant  58-year-old female with pulmonary arterial hypertension associated with systemic lupus erythematosus.  She is currently on up-front combination therapy with ambrisentan and Adcirca.      I am delighted to say that Ms. Huddleston is doing very well from a pulmonary arterial hypertension perspective.  She is currently NYHA functional class II.  She has no evidence of right heart failure on exam.  Her 6MWD is stable. She has no significant desaturations.     I did not make any changes.  We will continue her on Letairis at 10 mg and Adcirca 40 mg daily.  We will continue her on digoxin.  She takes Lasix as needed.       She will return to clinic in 6 months with n echo, 6MWT, and labs.      It was a pleasure meeting Ms. Lin Huddleston in Pulmonary Hypertension Clinic.  We thank you for involving us in her care.  Please do not hesitate to call us in the interim if you have any further questions.      Sincerely,   Ronn Brody MD   [facility]  Heart Failure, Transplant, and Mechanical Circulatory Support Cardiology   Cardiovascular Division  [facility]   [phone]         cc:   Nelson Mcgraw MD    [facility] [place] [address]         RONN BRODY MD             D: 2016 18:34   T: 2016 11:36   MT: VANESSA      Name:     LIN HUDDLESTON   MRN:      [id number]        Account:      [id number]   :      1957           Service Date: 2016      Document: [id number]     38 year old , hx of bipolar admitted for excessive intoxication, heavily drinking for weeks in context of breakup with fiance  recommended for patient to enter substance treatment, patient not interested at this time but requesting list of resources  pt will continue to see therapist Nabila Rodrigues weekly, second therapist Thanh Caldwell monthly, and psychiatrist Dr Lorenzana  patient given extensive list of local substance treatment options by BREE

## 2018-11-20 NOTE — ED BEHAVIORAL HEALTH ASSESSMENT NOTE - DESCRIPTION
calm  Vital Signs Last 24 Hrs  T(C): 37.1 (20 Nov 2018 15:10), Max: 37.1 (20 Nov 2018 15:10)  T(F): 98.8 (20 Nov 2018 15:10), Max: 98.8 (20 Nov 2018 15:10)  HR: 96 (20 Nov 2018 15:10) (85 - 96)  BP: 103/72 (20 Nov 2018 15:10) (103/72 - 122/89)  BP(mean): --  RR: 18 (20 Nov 2018 15:10) (18 - 18)  SpO2: 98% (20 Nov 2018 15:10) (93% - 98%) none  has 10 yo daughter, reports today was her final chance to show up to work or else she would be fired, has been on medical leave for months due to getting EBV in August

## 2018-11-20 NOTE — ED BEHAVIORAL HEALTH ASSESSMENT NOTE - REFERRAL / APPOINTMENT DETAILS
pt will continue to see therapist Nabila Rodrigues weekly, second therapist Thanh Caldwell monthly, and psychiatrist Dr Lorenzana

## 2018-11-20 NOTE — ED STATDOCS - PHYSICAL EXAMINATION
Constitutional: non-toxic appearing, in no acute distress  EYES: Pupils 4-5mm, no scleral icterus  RESPIRATORY: lungs clear and equal b/l.  CARDIAC: heart regular  NEURO: coordination intact, normal gait Constitutional: non-toxic appearing, in no acute distress  EYES: Pupils 4-5mm, no scleral icterus  ENT: Moist mm's  RESPIRATORY: lungs clear and equal b/l.  CARDIAC: heart regular  ABD: soft and nontender  : No CVAT earnestine  NEURO: coordination intact, normal gait

## 2018-11-20 NOTE — ED ADULT NURSE REASSESSMENT NOTE - NS ED NURSE REASSESS COMMENT FT1
Patient resting in bed. Patient endorses being slightly anxious. Eager to speak with psychiatrist. Alert and oriented x4. No psychotic symptoms. Denies Suicidal/homicidal ideations. Patient ate 100% of lunch. Safety Maintained.

## 2018-11-27 ENCOUNTER — INPATIENT (INPATIENT)
Facility: HOSPITAL | Age: 38
LOS: 0 days | Discharge: LEFT BEFORE DISCHARGE | End: 2018-11-28
Attending: HOSPITALIST | Admitting: HOSPITALIST
Payer: MEDICAID

## 2018-11-27 VITALS
TEMPERATURE: 98 F | HEART RATE: 77 BPM | RESPIRATION RATE: 20 BRPM | OXYGEN SATURATION: 100 % | DIASTOLIC BLOOD PRESSURE: 81 MMHG | SYSTOLIC BLOOD PRESSURE: 120 MMHG

## 2018-11-27 DIAGNOSIS — F10.239 ALCOHOL DEPENDENCE WITH WITHDRAWAL, UNSPECIFIED: ICD-10-CM

## 2018-11-27 DIAGNOSIS — F31.30 BIPOLAR DISORDER, CURRENT EPISODE DEPRESSED, MILD OR MODERATE SEVERITY, UNSPECIFIED: ICD-10-CM
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
## 2018-11-27 DIAGNOSIS — Z29.9 ENCOUNTER FOR PROPHYLACTIC MEASURES, UNSPECIFIED: ICD-10-CM

## 2018-11-27 DIAGNOSIS — M67.912 UNSPECIFIED DISORDER OF SYNOVIUM AND TENDON, LEFT SHOULDER: Chronic | ICD-10-CM

## 2018-11-27 DIAGNOSIS — F10.232 ALCOHOL DEPENDENCE WITH WITHDRAWAL WITH PERCEPTUAL DISTURBANCE: ICD-10-CM

## 2018-11-27 DIAGNOSIS — R44.1 VISUAL HALLUCINATIONS: ICD-10-CM

## 2018-11-27 LAB
ALBUMIN SERPL ELPH-MCNC: 4.7 G/DL — SIGNIFICANT CHANGE UP (ref 3.3–5)
ALP SERPL-CCNC: 61 U/L — SIGNIFICANT CHANGE UP (ref 40–120)
ALT FLD-CCNC: 10 U/L — SIGNIFICANT CHANGE UP (ref 4–33)
AMPHET UR-MCNC: NEGATIVE — SIGNIFICANT CHANGE UP
APAP SERPL-MCNC: < 15 UG/ML — LOW (ref 15–25)
APPEARANCE UR: CLEAR — SIGNIFICANT CHANGE UP
AST SERPL-CCNC: 18 U/L — SIGNIFICANT CHANGE UP (ref 4–32)
BACTERIA # UR AUTO: NEGATIVE — SIGNIFICANT CHANGE UP
BARBITURATES UR SCN-MCNC: NEGATIVE — SIGNIFICANT CHANGE UP
BASOPHILS # BLD AUTO: 0.08 K/UL — SIGNIFICANT CHANGE UP (ref 0–0.2)
BASOPHILS NFR BLD AUTO: 0.9 % — SIGNIFICANT CHANGE UP (ref 0–2)
BENZODIAZ UR-MCNC: POSITIVE — SIGNIFICANT CHANGE UP
BILIRUB SERPL-MCNC: 0.5 MG/DL — SIGNIFICANT CHANGE UP (ref 0.2–1.2)
BILIRUB UR-MCNC: NEGATIVE — SIGNIFICANT CHANGE UP
BLOOD UR QL VISUAL: NEGATIVE — SIGNIFICANT CHANGE UP
BUN SERPL-MCNC: 14 MG/DL — SIGNIFICANT CHANGE UP (ref 7–23)
CALCIUM SERPL-MCNC: 9.6 MG/DL — SIGNIFICANT CHANGE UP (ref 8.4–10.5)
CANNABINOIDS UR-MCNC: NEGATIVE — SIGNIFICANT CHANGE UP
CHLORIDE SERPL-SCNC: 103 MMOL/L — SIGNIFICANT CHANGE UP (ref 98–107)
CO2 SERPL-SCNC: 24 MMOL/L — SIGNIFICANT CHANGE UP (ref 22–31)
COCAINE METAB.OTHER UR-MCNC: NEGATIVE — SIGNIFICANT CHANGE UP
COLOR SPEC: YELLOW — SIGNIFICANT CHANGE UP
CREAT SERPL-MCNC: 0.85 MG/DL — SIGNIFICANT CHANGE UP (ref 0.5–1.3)
EOSINOPHIL # BLD AUTO: 0.16 K/UL — SIGNIFICANT CHANGE UP (ref 0–0.5)
EOSINOPHIL NFR BLD AUTO: 1.7 % — SIGNIFICANT CHANGE UP (ref 0–6)
ETHANOL BLD-MCNC: < 10 MG/DL — SIGNIFICANT CHANGE UP
GLUCOSE SERPL-MCNC: 102 MG/DL — HIGH (ref 70–99)
GLUCOSE UR-MCNC: NEGATIVE — SIGNIFICANT CHANGE UP
HCT VFR BLD CALC: 43.9 % — SIGNIFICANT CHANGE UP (ref 34.5–45)
HGB BLD-MCNC: 15.1 G/DL — SIGNIFICANT CHANGE UP (ref 11.5–15.5)
HYALINE CASTS # UR AUTO: NEGATIVE — SIGNIFICANT CHANGE UP
IMM GRANULOCYTES # BLD AUTO: 0.04 # — SIGNIFICANT CHANGE UP
IMM GRANULOCYTES NFR BLD AUTO: 0.4 % — SIGNIFICANT CHANGE UP (ref 0–1.5)
KETONES UR-MCNC: NEGATIVE — SIGNIFICANT CHANGE UP
LEUKOCYTE ESTERASE UR-ACNC: NEGATIVE — SIGNIFICANT CHANGE UP
LYMPHOCYTES # BLD AUTO: 2.85 K/UL — SIGNIFICANT CHANGE UP (ref 1–3.3)
LYMPHOCYTES # BLD AUTO: 30.4 % — SIGNIFICANT CHANGE UP (ref 13–44)
MCHC RBC-ENTMCNC: 33.6 PG — SIGNIFICANT CHANGE UP (ref 27–34)
MCHC RBC-ENTMCNC: 34.4 % — SIGNIFICANT CHANGE UP (ref 32–36)
MCV RBC AUTO: 97.8 FL — SIGNIFICANT CHANGE UP (ref 80–100)
METHADONE UR-MCNC: NEGATIVE — SIGNIFICANT CHANGE UP
MONOCYTES # BLD AUTO: 0.58 K/UL — SIGNIFICANT CHANGE UP (ref 0–0.9)
MONOCYTES NFR BLD AUTO: 6.2 % — SIGNIFICANT CHANGE UP (ref 2–14)
NEUTROPHILS # BLD AUTO: 5.65 K/UL — SIGNIFICANT CHANGE UP (ref 1.8–7.4)
NEUTROPHILS NFR BLD AUTO: 60.4 % — SIGNIFICANT CHANGE UP (ref 43–77)
NITRITE UR-MCNC: NEGATIVE — SIGNIFICANT CHANGE UP
NRBC # FLD: 0 — SIGNIFICANT CHANGE UP
OPIATES UR-MCNC: NEGATIVE — SIGNIFICANT CHANGE UP
OXYCODONE UR-MCNC: NEGATIVE — SIGNIFICANT CHANGE UP
PCP UR-MCNC: NEGATIVE — SIGNIFICANT CHANGE UP
PH UR: 6 — SIGNIFICANT CHANGE UP (ref 5–8)
PLATELET # BLD AUTO: 301 K/UL — SIGNIFICANT CHANGE UP (ref 150–400)
PMV BLD: 9.6 FL — SIGNIFICANT CHANGE UP (ref 7–13)
POTASSIUM SERPL-MCNC: 5.3 MMOL/L — SIGNIFICANT CHANGE UP (ref 3.5–5.3)
POTASSIUM SERPL-SCNC: 5.3 MMOL/L — SIGNIFICANT CHANGE UP (ref 3.5–5.3)
PROT SERPL-MCNC: 7.4 G/DL — SIGNIFICANT CHANGE UP (ref 6–8.3)
PROT UR-MCNC: 20 — SIGNIFICANT CHANGE UP
RBC # BLD: 4.49 M/UL — SIGNIFICANT CHANGE UP (ref 3.8–5.2)
RBC # FLD: 12.9 % — SIGNIFICANT CHANGE UP (ref 10.3–14.5)
RBC CASTS # UR COMP ASSIST: SIGNIFICANT CHANGE UP (ref 0–?)
SALICYLATES SERPL-MCNC: < 5 MG/DL — LOW (ref 15–30)
SODIUM SERPL-SCNC: 140 MMOL/L — SIGNIFICANT CHANGE UP (ref 135–145)
SP GR SPEC: 1.02 — SIGNIFICANT CHANGE UP (ref 1–1.04)
SQUAMOUS # UR AUTO: SIGNIFICANT CHANGE UP
TSH SERPL-MCNC: 1.29 UIU/ML — SIGNIFICANT CHANGE UP (ref 0.27–4.2)
UROBILINOGEN FLD QL: NORMAL — SIGNIFICANT CHANGE UP
WBC # BLD: 9.36 K/UL — SIGNIFICANT CHANGE UP (ref 3.8–10.5)
WBC # FLD AUTO: 9.36 K/UL — SIGNIFICANT CHANGE UP (ref 3.8–10.5)
WBC UR QL: SIGNIFICANT CHANGE UP (ref 0–?)

## 2018-11-27 PROCEDURE — 99223 1ST HOSP IP/OBS HIGH 75: CPT

## 2018-11-27 PROCEDURE — 70450 CT HEAD/BRAIN W/O DYE: CPT | Mod: 26

## 2018-11-27 PROCEDURE — 71046 X-RAY EXAM CHEST 2 VIEWS: CPT | Mod: 26

## 2018-11-27 PROCEDURE — 72125 CT NECK SPINE W/O DYE: CPT | Mod: 26

## 2018-11-27 PROCEDURE — 99223 1ST HOSP IP/OBS HIGH 75: CPT | Mod: GC

## 2018-11-27 RX ORDER — PROPRANOLOL HCL 160 MG
1 CAPSULE, EXTENDED RELEASE 24HR ORAL
Qty: 0 | Refills: 0 | COMMUNITY

## 2018-11-27 RX ORDER — CLONAZEPAM 1 MG
0.5 TABLET ORAL DAILY
Qty: 0 | Refills: 0 | Status: DISCONTINUED | OUTPATIENT
Start: 2018-11-28 | End: 2018-11-28

## 2018-11-27 RX ORDER — PROPRANOLOL HCL 160 MG
40 CAPSULE, EXTENDED RELEASE 24HR ORAL
Qty: 0 | Refills: 0 | Status: DISCONTINUED | OUTPATIENT
Start: 2018-11-27 | End: 2018-11-28

## 2018-11-27 RX ORDER — CLONAZEPAM 1 MG
1 TABLET ORAL DAILY
Qty: 0 | Refills: 0 | Status: DISCONTINUED | OUTPATIENT
Start: 2018-11-27 | End: 2018-11-28

## 2018-11-27 RX ORDER — FOLIC ACID 0.8 MG
1 TABLET ORAL DAILY
Qty: 0 | Refills: 0 | Status: DISCONTINUED | OUTPATIENT
Start: 2018-11-27 | End: 2018-11-28

## 2018-11-27 RX ORDER — ENOXAPARIN SODIUM 100 MG/ML
40 INJECTION SUBCUTANEOUS DAILY
Qty: 0 | Refills: 0 | Status: DISCONTINUED | OUTPATIENT
Start: 2018-11-27 | End: 2018-11-27

## 2018-11-27 RX ORDER — THIAMINE MONONITRATE (VIT B1) 100 MG
500 TABLET ORAL DAILY
Qty: 0 | Refills: 0 | Status: DISCONTINUED | OUTPATIENT
Start: 2018-11-27 | End: 2018-11-28

## 2018-11-27 RX ORDER — ACETAMINOPHEN 500 MG
650 TABLET ORAL EVERY 6 HOURS
Qty: 0 | Refills: 0 | Status: DISCONTINUED | OUTPATIENT
Start: 2018-11-27 | End: 2018-11-28

## 2018-11-27 RX ORDER — CLONAZEPAM 1 MG
2 TABLET ORAL
Qty: 0 | Refills: 0 | COMMUNITY

## 2018-11-27 RX ORDER — QUETIAPINE FUMARATE 200 MG/1
1 TABLET, FILM COATED ORAL
Qty: 0 | Refills: 0 | COMMUNITY

## 2018-11-27 RX ORDER — INFLUENZA VIRUS VACCINE 15; 15; 15; 15 UG/.5ML; UG/.5ML; UG/.5ML; UG/.5ML
0.5 SUSPENSION INTRAMUSCULAR ONCE
Qty: 0 | Refills: 0 | Status: DISCONTINUED | OUTPATIENT
Start: 2018-11-27 | End: 2018-11-28

## 2018-11-27 RX ORDER — QUETIAPINE FUMARATE 200 MG/1
100 TABLET, FILM COATED ORAL DAILY
Qty: 0 | Refills: 0 | Status: DISCONTINUED | OUTPATIENT
Start: 2018-11-27 | End: 2018-11-28

## 2018-11-27 RX ORDER — SODIUM CHLORIDE 9 MG/ML
1000 INJECTION INTRAMUSCULAR; INTRAVENOUS; SUBCUTANEOUS ONCE
Qty: 0 | Refills: 0 | Status: COMPLETED | OUTPATIENT
Start: 2018-11-27 | End: 2018-11-27

## 2018-11-27 RX ADMIN — Medication 1 MILLIGRAM(S): at 22:41

## 2018-11-27 RX ADMIN — SODIUM CHLORIDE 1000 MILLILITER(S): 9 INJECTION INTRAMUSCULAR; INTRAVENOUS; SUBCUTANEOUS at 15:00

## 2018-11-27 RX ADMIN — QUETIAPINE FUMARATE 100 MILLIGRAM(S): 200 TABLET, FILM COATED ORAL at 22:41

## 2018-11-27 RX ADMIN — Medication 2 MILLIGRAM(S): at 18:04

## 2018-11-27 RX ADMIN — Medication 2 MILLIGRAM(S): at 22:48

## 2018-11-27 RX ADMIN — Medication 650 MILLIGRAM(S): at 22:41

## 2018-11-27 RX ADMIN — Medication 650 MILLIGRAM(S): at 23:30

## 2018-11-27 RX ADMIN — Medication 1 MILLIGRAM(S): at 15:44

## 2018-11-27 RX ADMIN — Medication 105 MILLIGRAM(S): at 22:59

## 2018-11-27 NOTE — ED PROVIDER NOTE - OBJECTIVE STATEMENT
37 y/o female pmh bipolar c/o head injury x1 week ago, now w/ worsening visual hallucinations. Pt admits to drinking ETOH x1 week ago and falling down 2 flights of staires at her parent roberto. Pt does not remember details about the event, the first thing she remembers after the fall is sitting at the kithcen table where she fell off the chair and struck her head on the marble floor. Pt states that she was seen at [facility] that night but was placed in a room for 8 hours and had nothing done. Pt admits to worsening headache, confusion, auditory and visual hallucinations since then. Pt admits to "looking at edges of things and seeing waterfalls, also saw 3 men trying to break into my mothers car last night but my family was home and said it wasn't real." Pt also admits to stuttering her words which is new for her, and "hearing tones". Pt went to her Therapist x1 day ago, Nabila Rodrigues who rec pt be seen in the ER for eval. Pt denies chest pain, sob, abd pain, n/v/d, dysuria, vaginal bleeding, numbness, tingling, weakness, syncope, fever or chills. Denies SI or HI.  As per pts sister, pt has been depressed over the last month. Pt has been drinking excessively and taking her Seroquel, xanax and Klonopin excessively. States that pt was seen in [facility] last week for psych eval but pt did not want to stay and when she sobered up left. Admits that pt has been speaking to the [employer] of [employer] last several days. 37 y/o female pmh bipolar c/o head injury x1 week ago, now w/ worsening visual hallucinations. Pt admits to drinking ETOH x1 week ago and falling down 2 flights of stairs at her parent roberto. Pt does not remember details about the event, the first thing she remembers after the fall is sitting at the kithcen table where she fell off the chair and struck her head on the marble floor. Pt states that she was seen at [facility] that night but was placed in a room for 8 hours and had nothing done. Pt admits to worsening headache, confusion, auditory and visual hallucinations since then. Pt admits to "looking at edges of things and seeing waterfalls, also saw 3 men trying to break into my mothers car last night but my family was home and said it wasn't real." Pt also admits to stuttering her words which is new for her, and "hearing tones". Pt went to her Therapist x1 day ago, Nabila Rodrigues who rec pt be seen in the ER for eval. Pt denies chest pain, sob, abd pain, n/v/d, dysuria, vaginal bleeding, numbness, tingling, weakness, syncope, fever or chills. Denies SI or HI.  As per pts sister, pt has been depressed over the last month. Pt has been drinking excessively and taking her Seroquel, xanax and Klonopin excessively. States that pt was seen in [facility] last week for psych eval but pt did not want to stay and when she sobered up left. Admits that pt has been speaking to the [employer] decorations of rt last several days.

## 2018-11-27 NOTE — H&P ADULT - PROBLEM SELECTOR PLAN 5
- DVT prophylaxis: sub DVT prophylaxis: lovenox subq  Diet: Regular diet, (lactose intolerant)  SW consult, Psych recs pending  H&P written by Kishore Hillman, MS3  Reviewed and edited by Linda Issa PGY-2  Pager # [phone]/ [phone] DVT prophylaxis: Improve score 0, encourage ambulation  Diet: Regular diet, (lactose intolerant)  SW consult, Psych recs pending  H&P written by Kishore Hillman, MS3  Reviewed and edited by Linda Issa PGY-2  Pager # [phone]/ [phone]

## 2018-11-27 NOTE — ED ADULT NURSE NOTE - OBJECTIVE STATEMENT
Pt rcvd to int 6 c/o Headache, N/V. States HA has been x 1 week after falling and hitting head twice. Hx: Bipolar. Takes medication as prescribed. Currently calm/cooperative. Awake/alert, aox3. No neuro deficits observed at this time. Evaluated by PA. Labs drawn & sent. 20g IV placed to R ac. Pt rcvd to int 6 c/o Headache, N/V. States HA has been x 1 week after falling and hitting head twice. Hx: Bipolar. Takes medication as prescribed. Currently calm/cooperative. Awake/alert, aox3. No neuro deficits observed at this time. Evaluated by PA. Labs drawn & sent. 20g IV placed to R ac.    Addendum: Pt evaluated by ED att. Pt has been having difficulties in personal life with increased drinking over this past week. Pt began having auditory and visual hallucinations and sustained fall due to ETOH. Continues to deny SI/HI. To be admitted. Will continue to monitor.

## 2018-11-27 NOTE — H&P ADULT - PROBLEM SELECTOR PLAN 3
- Likely multifactorial: EtOH withdrawal vs. benzodiazepine withdrawal vs. stress-induced psychosis  - Pending Psych recs - Likely multifactorial: EtOH withdrawal vs. benzodiazepine withdrawal vs. stress-induced psychosis  - self aware of AH and VH, currently stable  - Pending Psych recs - Likely multifactorial: EtOH withdrawal vs. benzodiazepine withdrawal vs. stress-induced psychosis vs thiamine deficiency (can cause blurred vision)  - self aware of AH and VH, currently stable  - Pending Psych recs

## 2018-11-27 NOTE — ED PROVIDER NOTE - ATTENDING CONTRIBUTION TO CARE
38f h/o bipolar disorder on seroquel/benzo, presenting with increasing visual/auditory hallucinations, increased alcohol use, increased depression s/p breakup with boyfriend, also with multiple falls. Patient has had multiple falls with worst being 1 week ago, fell down flight of stairs with head trauma, does not remember event. Since then has been having both auditory and visual hallucinations as described above. Has been drinking vodka almost daily for last few weeks, yesterday had 1 glass of wine, and no etoh since then. Denies any drug use. People she sees are not telling her to harm herself or anyone else, she has no thoughts of si/hi. Notes mild tremulousness diffusely since yesterday and increased anxiety.  exam  GEN -appears anxious, mildly tremulous; A+O x3   HEAD - NC/AT   EYES- PERRL, EOMI  ENT: Airway patent, mmm, +tongue fasciculation, Oral cavity and pharynx normal. No inflammation, swelling, exudate, or lesions.  NECK: Neck supple, non-tender without lymphadenopathy, no masses.  PULMONARY - CTA b/l, symmetric breath sounds.   CARDIAC -s1s2, RRR, no M,G,R  ABDOMEN - +BS, ND, NT, soft, no guarding, no rebound, no masses   BACK - no CVA tenderness, Normal  spine   EXTREMITIES - FROM, symmetric pulses, capillary refill < 2 seconds, no edema   SKIN - no rash or bruising   NEUROLOGIC - alert, speech clear, no focal deficits  PSYCH -nl mood/affect, nl insight.  a/p-patient with hallucinations, falls, recent increase in etoh use with cessation since yesterday, mildly tremulous, will check labs, ct head, ativan/fluids for withdrawal, reass.

## 2018-11-27 NOTE — BEHAVIORAL HEALTH ASSESSMENT NOTE - NSBHCONSULTMEDANXIETY_PSY_A_CORE FT
propanolol 40mg BID PRN - hold for parameters. would recommend only 1 medication for anxiety as PRN. do not give additional klonopin as patient on multiple medications at this time.

## 2018-11-27 NOTE — BEHAVIORAL HEALTH ASSESSMENT NOTE - NSBHCHARTREVIEWLAB_PSY_A_CORE FT
15.1   9.36  )-----------( 301      ( 27 Nov 2018 14:53 )             43.9   11-27    140  |  103  |  14  ----------------------------<  102<H>  5.3   |  24  |  0.85    Ca    9.6      27 Nov 2018 14:53    TPro  7.4  /  Alb  4.7  /  TBili  0.5  /  DBili  x   /  AST  18  /  ALT  10  /  AlkPhos  61  11-27

## 2018-11-27 NOTE — H&P ADULT - PROBLEM SELECTOR PLAN 2
- Will resume home seroquel 100mg qHS  - Clonazepam 1mg qHS, 0.5mg in AM PRN  - Will obtain STAT EKG to assess qTC  - Pending Psych recs - Will resume home seroquel 100mg qHS  - Clonazepam 1mg qHS, 0.5mg in AM PRN  - ISTOP# [id number] refer to chart note for detailed rx  - Will obtain STAT EKG to assess qTC  - Pending Psych recs - TSH currently 1.29  - Will resume home seroquel 100mg qHS  - Clonazepam 1mg qHS, 0.5mg in AM PRN  - ISTOP# [id number] refer to chart note for detailed rx  - Will obtain STAT EKG to assess qTC  - Pending Psych recs

## 2018-11-27 NOTE — BEHAVIORAL HEALTH ASSESSMENT NOTE - NSBHSUICPROTECTFACT_PSY_A_CORE
Positive therapeutic relationships/Responsibility to family and others/Supportive social network or family/Identifies reasons for living

## 2018-11-27 NOTE — ED ADULT NURSE NOTE - NSIMPLEMENTINTERV_GEN_ALL_ED
Implemented All Fall Risk Interventions:  Gates to call system. Call bell, personal items and telephone within reach. Instruct patient to call for assistance. Room bathroom lighting operational. Non-slip footwear when patient is off stretcher. Physically safe environment: no spills, clutter or unnecessary equipment. Stretcher in lowest position, wheels locked, appropriate side rails in place. Provide visual cue, wrist band, yellow gown, etc. Monitor gait and stability. Monitor for mental status changes and reorient to person, place, and time. Review medications for side effects contributing to fall risk. Reinforce activity limits and safety measures with patient and family.

## 2018-11-27 NOTE — BEHAVIORAL HEALTH ASSESSMENT NOTE - HPI (INCLUDE ILLNESS QUALITY, SEVERITY, DURATION, TIMING, CONTEXT, MODIFYING FACTORS, ASSOCIATED SIGNS AND SYMPTOMS)
Patient is a 38 year old  female admitted to [facility] for eoth withdrawal monitoring.  Currently lives with parents and daughter.  Daughter recently moved into patients home after living with her father for years. SHe has a PPHx of bipolar disorder, taking Seroquel. States she has been to inpatient psych hospitals x 4 on voluntary admissions for mood regulation. no past SA. No significant PMH. Psychiatry consulted for active hallucinations.  Patient is seen in [facility]. She is calm and cooperative.  Patient reports she has been diagnosed bipolar at a young age, however does not discuss details of depression and alba, rather states "my mood was all over the place" and "my sleep is terrible" when asked specifics.  Patient states she has been on multiple medications for mood control such as lamictal, remeron and tegretol, but felt sedated on these medications and has stopped.  Currently she takes Seroquel for mood, but has been changing the dose that is prescribed to  her.  Patient said "I know in low doses its more sedating so I only take 100mg not 150 at night to sleep."  She does not appear manic, no excessive or fast speech, is not tearful or reporting current depressed mood.  No current SI and remains future oriented.  Patient also reports having a hx of anxiety.  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 I stop shows xanax use since 2018, however patient did live in other states.  Patient has been going to a Shanita Godoy who is currently switching patient from xanax to clonopin.  Patient reports "I take 1mg at night and then as needed during the day."  Additionally, patient went through traumatic break up with boyfriend and started drinking daily x 3 weeks.  Patient drinking at least 1/2 bottle of vodka daily along with glasses of wine.  Last drink of vodka 1 wk ago, last drink of wine 1 day ago.  Appox 1 week ago patient states she feel down stairs and hit her head.  She is also reporting vivid hallucinations such as seeing men outside her window breaking into her moms car, seeing wallpaper patterns move and is also reported having recollection of events that did not take place such as having an intervention in her family's house and going to see her ex boyfriend with her child which both did not occur.  She is currently with mild tremors, anxiety, denies nausea and vomiting, thought pattern is linear.     Spoke with therapist who reports patient does not discuss in detail hx of alba and depression, cannot rule out borderline personality disorder.  Has abandonment issues and went through traumatic break up causing her to drink daily.  patient never with psychosis in the past.

## 2018-11-27 NOTE — H&P ADULT - NSHPPHYSICALEXAM_GEN_ALL_CORE
General: WN/WD NAD  Neurology: A&Ox3, nonfocal, LINARES x 4, mild UE tremors on extension  Eyes: PERRLA/ EOMI, Gross vision intact  ENT/Neck: Neck supple, trachea midline, No JVD, Gross hearing intact  Respiratory: CTA B/L, No wheezing, rales, rhonchi  CV: RRR, S1S2, no murmurs, rubs or gallops  Abdominal: Soft, NT, ND +BS,   Extremities: No edema, + peripheral pulses  Skin: No Rashes, Hematoma, Ecchymosis  Psych: Appeared stated age, well groomed wearing clothing appropriate for an ED setting. Cooperative with interview. No psychomotor agitation/retardation. Speech with normal rate, tone, volume latency; occasionally stuttered. Mood reported as "sad." Affect congruent with mood, full-range, reactive. Denies SI/HI. Endorses AH/VH, but denies command hallucinations. Thought process linear.

## 2018-11-27 NOTE — BEHAVIORAL HEALTH ASSESSMENT NOTE - SUMMARY
Patient is a 38 year old  female admitted to [facility] for ETOH withdrawal monitoring.  Currently lives with parents and daughter.  Daughter recently moved into patients home after living with her father for years. SHe has a PPHx of bipolar disorder, taking Seroquel. States she has been to inpatient psych hospitals x 4 on voluntary admissions for mood regulation. no past SA. No significant PMH. Psychiatry consulted for active hallucinations.  Patient is seen in [facility]. She is calm and cooperative.  Patient reports she has been diagnosed bipolar at a young age, however does not discuss details of depression and alba, rather states "my mood was all over the place" and "my sleep is terrible" when asked specifics she respinds vaguely.  Patient states she has been on multiple medications for mood control such as lamictal, remeron and tegretol, but felt lethargic on these medications and has stopped.  Currently she takes Seroquel for mood, but has been changing the dose that is prescribed to  her.  Patient said "I know in low doses its more sedating so I only take 100mg not 150 at night to sleep."  She does not appear manic, no excessive or fast speech, is not tearful or reporting current depressed mood.  No current SI and remains future oriented.  Patient also reports having a hx of anxiety.  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 I stop shows xanax use since 2018, however patient did live in other states.  Patient has been going to a Shanita Godoy who is currently switching patient from xanax to clonopin.  Patient reports "I take 1mg at night and then as needed during the day."  Additionally, patient went through traumatic break up with boyfriend and started drinking daily x 3 weeks.  Patient drinking at least 1/2 bottle of vodka daily along with glasses of wine.  Last drink of vodka 1 wk ago, last drink of wine 1 day ago.  Appox 1 week ago patient states she feel down stairs and hit her head.  She is also reporting vivid hallucinations such as seeing men outside her window breaking into her moms car, seeing wallpaper patterns move and is also reported having recollection of events that did not take place such as having an intervention in her family's house and going to see her ex boyfriend with her child which both did not occur as confirmed by family.  She is currently with mild tremors, anxiety, denies nausea and vomiting, thought pattern is linear.     Spoke with therapist who reports patient does not discuss in detail hx of alba and depression, cannot rule out borderline personality disorder.  Has abandonment issues and went through traumatic break up causing her to drink daily.  patient never with psychosis in the past.    PLAN  - initiate ciwa protocol at 2mg q4hrs and monitor for symptoms.  currently with mild tremor, no hallucinations  - use ativan as per ciwa orders for agitation 2mg  - for anxiety best to use 1 PRN method.  Can continue propanolol 40mg BID PRN with hold parameters for BP and HR.  - although best to stop additional benzos as patient is misusing at home, continue klonopin 1mg qhs. patient with no desire to stop this medication at this time  HOLD FOR SEDATION  - c/w seroquel 100mg qhs  - ongoing discussion of options of substance treatment Patient is a 38 year old  female admitted to [facility] for ETOH withdrawal monitoring.  Currently lives with parents and daughter.  Daughter recently moved into patients home after living with her father for years. SHe has a PPHx of bipolar disorder, taking Seroquel. States she has been to inpatient psych hospitals x 4 on voluntary admissions for mood regulation. no past SA. No significant PMH. Psychiatry consulted for active hallucinations.  Patient is seen in [facility]. She is calm and cooperative.  Patient reports she has been diagnosed bipolar at a young age, however does not discuss details of depression and alba, rather states "my mood was all over the place" and "my sleep is terrible" when asked specifics she respinds vaguely.  Patient states she has been on multiple medications for mood control such as lamictal, remeron and tegretol, but felt lethargic on these medications and has stopped.  Currently she takes Seroquel for mood, but has been changing the dose that is prescribed to  her.  Patient said "I know in low doses its more sedating so I only take 100mg not 150 at night to sleep."  She does not appear manic, no excessive or fast speech, is not tearful or reporting current depressed mood.  No current SI and remains future oriented.  Patient also reports having a hx of anxiety.  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 I stop shows xanax use since 2018, however patient did live in other states.  Patient has been going to a Shanita Godoy who is currently switching patient from xanax to clonopin.  Patient reports "I take 1mg at night and then as needed during the day."  Additionally, patient went through traumatic break up with boyfriend and started drinking daily x 3 weeks.  Patient drinking at least 1/2 bottle of vodka daily along with glasses of wine.  Last drink of vodka 1 wk ago, last drink of wine 1 day ago.  Appox 1 week ago patient states she feel down stairs and hit her head.  She is also reporting vivid hallucinations such as seeing men outside her window breaking into her moms car, seeing wallpaper patterns move and is also reported having recollection of events that did not take place such as having an intervention in her family's house and going to see her ex boyfriend with her child which both did not occur as confirmed by family.  She is currently with mild tremors, anxiety, denies nausea and vomiting, thought pattern is linear.     Spoke with therapist who reports patient does not discuss in detail hx of alba and depression, cannot rule out borderline personality disorder.  Has abandonment issues and went through traumatic break up causing her to drink daily.  patient never with psychosis in the past.    PLAN  - initiate ciwa protocol at 2mg q4hrs and monitor for symptoms.  currently with mild tremor, no hallucinations  - use ativan as per ciwa orders for agitation 2mg  - for anxiety best to use 1 PRN method.  Can continue propanolol 40mg BID PRN with hold parameters for BP and HR.  - although best to stop additional benzos as patient is misusing at home, continue klonopin 1mg qhs. patient with no desire to stop this medication at this time  HOLD FOR SEDATION  - c/w seroquel 100mg qhs  - IV thiamine 100mg IV TID x 3 days  - ongoing discussion of options of substance treatment  - istop Reference #: [id number]

## 2018-11-27 NOTE — H&P ADULT - PROBLEM SELECTOR PLAN 4
- CT Head unremarkable, CT cervical spine significant for multilevel degenerative changes  - Able to ambulate without assistance - In the setting of alcohol abuse  - CT Head unremarkable, CT cervical spine significant for multilevel degenerative changes  - Able to ambulate without assistance

## 2018-11-27 NOTE — ED ADULT TRIAGE NOTE - CHIEF COMPLAINT QUOTE
pt with Hx. bi polar coming with S/P fall x 2 1 wk ago pt stated fell 2 steps and the 2nd time by her island table injured left side of her head, pt stated vison blur/visual hallucination.   denies SI/no alcohol since last wk on Xanax

## 2018-11-27 NOTE — H&P ADULT - NSHPLABSRESULTS_GEN_ALL_CORE
15.1   9.36  )-----------( 301      ( 2018 14:53 )             43.9           140  |  103  |  14  ----------------------------<  102<H>  5.3   |  24  |  0.85    Ca    9.6      2018 14:53    TPro  7.4  /  Alb  4.7  /  TBili  0.5  /  DBili  x   /  AST  18  /  ALT  10  /  AlkPhos  61                Urinalysis Basic - ( 2018 14:53 )    Color: YELLOW / Appearance: CLEAR / S.025 / pH: 6.0  Gluc: NEGATIVE / Ketone: NEGATIVE  / Bili: NEGATIVE / Urobili: NORMAL   Blood: NEGATIVE / Protein: 20 / Nitrite: NEGATIVE   Leuk Esterase: NEGATIVE / RBC: 0-2 / WBC 0-2   Sq Epi: OCC / Non Sq Epi: x / Bacteria: NEGATIVE      Lactate Trend      CAPILLARY BLOOD GLUCOSE

## 2018-11-27 NOTE — BEHAVIORAL HEALTH ASSESSMENT NOTE - NSBHCHARTREVIEWVS_PSY_A_CORE FT
Vital Signs Last 24 Hrs  T(C): 37 (27 Nov 2018 19:09), Max: 37 (27 Nov 2018 19:09)  T(F): 98.6 (27 Nov 2018 19:09), Max: 98.6 (27 Nov 2018 19:09)  HR: 82 (27 Nov 2018 19:09) (77 - 86)  BP: 118/75 (27 Nov 2018 19:09) (118/75 - 147/97)  BP(mean): --  RR: 18 (27 Nov 2018 19:09) (18 - 20)  SpO2: 100% (27 Nov 2018 19:09) (97% - 100%)

## 2018-11-27 NOTE — BEHAVIORAL HEALTH ASSESSMENT NOTE - NSBHCONSULTRECOMMENDOTHER_PSY_A_CORE FT
[facility] [phone] (M-F 9am-7pm)     [facility] [phone]    discuss all substance treatment options

## 2018-11-27 NOTE — H&P ADULT - HISTORY OF PRESENT ILLNESS
Alisa is a 37 y/o F with a PMH of bipolar disorder who presents with blurry vision and visual hallucinations in the setting of 2 falls one week ago. Over the past 3 weeks, she states that she has been drinking approximately one-half bottle of hard liquor per day following a difficult breakup with her boyfriend of many years. Last Tuesday, she reportedly fell down 2 flights of stairs. After the first fall, she tried to sit in a chair and missed the seat, ultimately falling again and striking her head against the kitchen counter. She does not remember the events of this night and was later informed of what happened by her family. Three days ago, she began to experience what she believes are visual and auditory "hallucinations." On one occasion, she states that she and her daughter drove to her ex-boyfriend's home and had a conversation with him and several members of his family before driving back home. On a separate occasion, she reports that she saw a man standing outside her parents' home attempting to break into her mother's car. When she went upstairs to tell her mother, she states that she saw three men outside, but when she got her mother, the men were gone. The "hallucinations" that she had were not corroborated by her family and they state that none of these events ever happened. She denies SI/HI and command hallucinations. She states that since the fall last week, she has only consumed 3 glasses of wine in total. Her last drink was one half glass of wine last night at 6pm. She has never had any withdrawal symptoms in the past.    Additionally, she is in the process of switching her psychiatric medications from Xanax to Klonopin for her anxiety. She has been in the process of tapering Xanax over the past week. Furthermore, she reports that she has been having difficulty sleeping since her breakup and had been taking mirtazepine, the last dose of which she took last night.    Furthermore, she endorses blurry vision for 1 week, subjective fevers with sweating for several days, and an episode of self-induced vomiting last night.    In the ED, her vital signs on admission were 98.1F, HR77, /81, RR20 on RA. IV push ativan and 1L NS were given. CT head was unremarkable and CT cervical spine showed multilevel degenerative disease. CXR was unremarkable.

## 2018-11-27 NOTE — BEHAVIORAL HEALTH ASSESSMENT NOTE - NSBHCHARTREVIEWIMAGING_PSY_A_CORE FT
< from: CT Head No Cont (11.27.18 @ 17:11) >      Impression: Degenerative changes involving the cervical spine without   evidence of a fracture or dislocation seen.      < end of copied text >

## 2018-11-27 NOTE — PATIENT PROFILE ADULT - NSALCOHOLFREQ_GEN_A_NUR
monthly or less/pt states she normally drinks once a month but in the past three weeks she has been drinking 1/2 bottle of liquor to deal with break up

## 2018-11-27 NOTE — H&P ADULT - PROBLEM SELECTOR PLAN 1
- Current CIWA score 7 for tremors, anxiety, and headache  - Will initiate Ativan taper  - IV thiamine for 3 days, folic acid, multivitamin  - DOMINICK on admission <10  - Psych consult pending, social work consult - Current CIWA score 7 for tremors, anxiety, and headache  - Will initiate Ativan taper  - IV thiamine for 3 days, folic acid, multivitamin  - BAL on admission <10  - Psych consult pending, social work consult - Current CIWA score 7 for tremors, anxiety, and headache  - Will initiate low risk Ativan taper  - IV thiamine for 3 days, folic acid, multivitamin  - BAL on admission <10, utox +ve benzo  - social work consult - Current CIWA score 7 for tremors, anxiety, and headache  - Will initiate low risk PO Ativan taper  - IV thiamine for 3 days, folic acid, multivitamin  - BAL on admission <10, utox +ve benzo  - social work consult - Current CIWA score 7 for tremors, anxiety, and headache  - Will initiate low risk PO Ativan taper  - IV thiamine for 3 days, folic acid, multivitamin (followed by oral supplementation)  - BAL on admission <10, utox +ve benzo  - social work consult

## 2018-11-27 NOTE — BEHAVIORAL HEALTH ASSESSMENT NOTE - RISK ASSESSMENT
Protective factors include no suicide attempts, no violence history, supportive family, willingness to seek help, no suicidal ideation or homicidal ideation, hopefulness for future.   Chronc risk factors include: substance abuse, hx of admissions, hx of mood dysregulation, recent break up    Patient does remain at elevated risk due these factors, however she denies wanting to harm herself or others.  She could benefit from proper medication administration and management along with possible substance treatment. To be further explored with patient throughout stay.

## 2018-11-27 NOTE — H&P ADULT - NSHPSOCIALHISTORY_GEN_ALL_CORE
Broke up with her on and off boyfriend of many years 3 weeks ago, with whom she was monogamous for the past year. Stated that she was previous tested for HIV and Hepatitis panel, which both returned negative. She states that she is currently self-employed. She states that she has a 10 year old daughter. No prior history of EtOH dependence. States that she vapes. Denies other toxic habits.

## 2018-11-27 NOTE — ED PROVIDER NOTE - MEDICAL DECISION MAKING DETAILS
39 y/o female pmh bipolar w/ worsening depression, ETOH abuse and auditory and visual hallucinations- labs, tox, CT head and neck, cxr, ua, Psych

## 2018-11-27 NOTE — H&P ADULT - ASSESSMENT
39y/o F with PMH Bipolar disorder admitted for EtOH withdrawal with auditory and visual hallucinations.

## 2018-11-27 NOTE — H&P ADULT - FAMILY HISTORY
No pertinent family history in first degree relatives No pertinent family history in first degree relatives, ~ no cardiovascular or endocrinologocail diseases reported

## 2018-11-27 NOTE — BEHAVIORAL HEALTH ASSESSMENT NOTE - DESCRIPTION (FIRST USE, LAST USE, QUANTITY, FREQUENCY, DURATION)
pt reports a hx of use, denies at current 1/2 liter voka daily x 3 weeks - stopped approx 1 wk ago however continued to drink 3 glassess of wine daily up until day before admission xanax 2mg daily beging switch to clonopin as outpt at this time

## 2018-11-28 VITALS
SYSTOLIC BLOOD PRESSURE: 113 MMHG | RESPIRATION RATE: 16 BRPM | HEART RATE: 80 BPM | TEMPERATURE: 98 F | DIASTOLIC BLOOD PRESSURE: 61 MMHG | OXYGEN SATURATION: 98 %

## 2018-11-28 DIAGNOSIS — Z90.710 ACQUIRED ABSENCE OF BOTH CERVIX AND UTERUS: Chronic | ICD-10-CM

## 2018-11-28 DIAGNOSIS — Z02.9 ENCOUNTER FOR ADMINISTRATIVE EXAMINATIONS, UNSPECIFIED: ICD-10-CM

## 2018-11-28 LAB
24R-OH-CALCIDIOL SERPL-MCNC: 39.6 NG/ML — SIGNIFICANT CHANGE UP (ref 30–80)
BUN SERPL-MCNC: 14 MG/DL — SIGNIFICANT CHANGE UP (ref 7–23)
CALCIUM SERPL-MCNC: 8.3 MG/DL — LOW (ref 8.4–10.5)
CHLORIDE SERPL-SCNC: 106 MMOL/L — SIGNIFICANT CHANGE UP (ref 98–107)
CO2 SERPL-SCNC: 23 MMOL/L — SIGNIFICANT CHANGE UP (ref 22–31)
CREAT SERPL-MCNC: 0.81 MG/DL — SIGNIFICANT CHANGE UP (ref 0.5–1.3)
FOLATE SERPL-MCNC: 16.5 NG/ML — SIGNIFICANT CHANGE UP (ref 4.7–20)
GLUCOSE SERPL-MCNC: 102 MG/DL — HIGH (ref 70–99)
HCT VFR BLD CALC: 38.2 % — SIGNIFICANT CHANGE UP (ref 34.5–45)
HGB BLD-MCNC: 12.9 G/DL — SIGNIFICANT CHANGE UP (ref 11.5–15.5)
MAGNESIUM SERPL-MCNC: 2.2 MG/DL — SIGNIFICANT CHANGE UP (ref 1.6–2.6)
MCHC RBC-ENTMCNC: 33.3 PG — SIGNIFICANT CHANGE UP (ref 27–34)
MCHC RBC-ENTMCNC: 33.8 % — SIGNIFICANT CHANGE UP (ref 32–36)
MCV RBC AUTO: 98.7 FL — SIGNIFICANT CHANGE UP (ref 80–100)
NRBC # FLD: 0 — SIGNIFICANT CHANGE UP
PHOSPHATE SERPL-MCNC: 3.7 MG/DL — SIGNIFICANT CHANGE UP (ref 2.5–4.5)
PLATELET # BLD AUTO: 213 K/UL — SIGNIFICANT CHANGE UP (ref 150–400)
PMV BLD: 9.9 FL — SIGNIFICANT CHANGE UP (ref 7–13)
POTASSIUM SERPL-MCNC: 4.1 MMOL/L — SIGNIFICANT CHANGE UP (ref 3.5–5.3)
POTASSIUM SERPL-SCNC: 4.1 MMOL/L — SIGNIFICANT CHANGE UP (ref 3.5–5.3)
RBC # BLD: 3.87 M/UL — SIGNIFICANT CHANGE UP (ref 3.8–5.2)
RBC # FLD: 13.1 % — SIGNIFICANT CHANGE UP (ref 10.3–14.5)
SODIUM SERPL-SCNC: 139 MMOL/L — SIGNIFICANT CHANGE UP (ref 135–145)
VIT B12 SERPL-MCNC: 1292 PG/ML — HIGH (ref 200–900)
WBC # BLD: 6.33 K/UL — SIGNIFICANT CHANGE UP (ref 3.8–10.5)
WBC # FLD AUTO: 6.33 K/UL — SIGNIFICANT CHANGE UP (ref 3.8–10.5)

## 2018-11-28 PROCEDURE — 99239 HOSP IP/OBS DSCHRG MGMT >30: CPT

## 2018-11-28 PROCEDURE — 99233 SBSQ HOSP IP/OBS HIGH 50: CPT

## 2018-11-28 RX ORDER — THIAMINE MONONITRATE (VIT B1) 100 MG
1 TABLET ORAL
Qty: 30 | Refills: 0 | OUTPATIENT
Start: 2018-11-28

## 2018-11-28 RX ORDER — FOLIC ACID 0.8 MG
1 TABLET ORAL
Qty: 0 | Refills: 0 | COMMUNITY
Start: 2018-11-28

## 2018-11-28 RX ORDER — MIRTAZAPINE 45 MG/1
1 TABLET, ORALLY DISINTEGRATING ORAL
Qty: 0 | Refills: 0 | COMMUNITY

## 2018-11-28 RX ORDER — PROPRANOLOL HCL 160 MG
1 CAPSULE, EXTENDED RELEASE 24HR ORAL
Qty: 0 | Refills: 0 | COMMUNITY

## 2018-11-28 RX ADMIN — Medication 2 MILLIGRAM(S): at 09:18

## 2018-11-28 NOTE — PROGRESS NOTE ADULT - PROBLEM SELECTOR PLAN 4
- In the setting of alcohol abuse  - CT Head unremarkable, CT cervical spine significant for multilevel degenerative changes  - Able to ambulate without assistance

## 2018-11-28 NOTE — PROGRESS NOTE BEHAVIORAL HEALTH - SUMMARY
Patient is a 38 year old  female admitted to [facility] for ETOH withdrawal monitoring.  Currently lives with parents and daughter.  Daughter recently moved into patients home after living with her father for years. SHe has a PPHx of bipolar disorder, taking Seroquel. States she has been to inpatient psych hospitals x 4 on voluntary admissions for mood regulation. no past SA. No significant PMH. Psychiatry consulted for active hallucination/alcohol withdrawal.    Pt with mildly elevated CIWA scales overnight requiring one dose of symptom triggered ativan. Pt reports feeling better with ativan, now with mild tremors, continued visual hallucinations, anxiety, diaphoresis and headache, but coherent, logical and oriented today. She describes a history of bipolar disorder with 4 voluntary hospitalizations but does not feel in need of hospitalization at this time and would like to go home once she is treated for her withdrawal symptoms. Visual hallucinations and other physical symptoms are likely in context of alcohol withdrawal rather than any active psychotic or manic process as patient denies other psychiatric symptoms and has been drinking heavily for the past three weeks.    PLAN  - Continue ciwa protocol at 2mg q4hrs and monitor for symptoms  - use ativan as per ciwa orders for agitation 2mg  - for anxiety best to use 1 PRN method.  Can continue propanolol 40mg BID PRN with hold parameters for BP and HR.  - although best to stop additional benzos as patient is misusing at home, continue klonopin 1mg qhs. patient with no desire to stop this medication at this time  HOLD FOR SEDATION  - c/w seroquel 100mg qhs  - IV thiamine 100mg IV TID x 3 days

## 2018-11-28 NOTE — PROGRESS NOTE ADULT - PROBLEM SELECTOR PLAN 3
Not actively hallucinating. Likely multifactorial: EtOH withdrawal vs. benzodiazepine withdrawal vs. stress-induced psychosis vs thiamine deficiency (can cause blurred vision)

## 2018-11-28 NOTE — PROGRESS NOTE ADULT - PROBLEM SELECTOR PLAN 2
- TSH currently 1.29. Evaluated by psych. Pt with possible associated personality disorder as well.   - continue seroquel 100mg qHS  - Clonazepam 1mg qHS, 0.5mg in AM PRN  - ISTOP# [id number] refer to chart note for detailed rx  - pt recommended to f/u with therapist, Nabila Rodrigues and psychiatry team, Dr. Shilpa Lorenzana and Dr. Schmidt. Pt was provided with information to [facility]

## 2018-11-28 NOTE — PROGRESS NOTE BEHAVIORAL HEALTH - NSBHCHARTREVIEWLAB_PSY_A_CORE FT
12.9   6.33  )-----------( 213      ( 28 Nov 2018 07:30 )             38.2   11-28    139  |  106  |  14  ----------------------------<  102<H>  4.1   |  23  |  0.81    Ca    8.3<L>      28 Nov 2018 07:30  Phos  3.7     11-28  Mg     2.2     11-28    TPro  7.4  /  Alb  4.7  /  TBili  0.5  /  DBili  x   /  AST  18  /  ALT  10  /  AlkPhos  61  11-27

## 2018-11-28 NOTE — PROGRESS NOTE BEHAVIORAL HEALTH - NSBHCHARTREVIEWVS_PSY_A_CORE FT
Vital Signs Last 24 Hrs  T(C): 36.5 (28 Nov 2018 05:00), Max: 37 (27 Nov 2018 19:09)  T(F): 97.7 (28 Nov 2018 05:00), Max: 98.6 (27 Nov 2018 19:09)  HR: 71 (28 Nov 2018 05:00) (71 - 86)  BP: 99/55 (28 Nov 2018 05:00) (99/55 - 147/97)  BP(mean): --  RR: 18 (28 Nov 2018 05:00) (18 - 20)  SpO2: 99% (28 Nov 2018 05:00) (96% - 100%)

## 2018-11-28 NOTE — DISCHARGE NOTE ADULT - HOSPITAL COURSE
38 F with a PMH of bipolar disorder who presents with blurry vision and visual hallucinations in the setting of 2 falls one week ago    Alcohol withdrawal syndrome with perceptual disturbance.     -Low risk PO Ativan CIWA   -IV thiamine for 3 days, folic acid, multivitamin  -BAL on admission <10, Utox +ve benzo.  -Current CIWA score 7 for tremors, anxiety, and headache     Bipolar affective disorder, current episode depressed, current episode severity   -Seroquel 100mg qHS, Clonazepam 1mg qHS, 0.5mg in AM PRN  -ISTOP# [id number], refer to chart note for detailed rx  -Psych Cx     Visual hallucinations.    -Likely multifactorial - EtOH withdrawal vs. benzodiazepine withdrawal vs. stress-induced psychosis vs thiamine deficiency (can cause blurred vision)  -Self aware of AH and VH, currently stable     Fall down stairs, initial encounter.     -CTH unremarkable, CT C-spine significant for multilevel degenerative changes  -Able to ambulate without assistance.    Pt signed out against medical advice.

## 2018-11-28 NOTE — PROGRESS NOTE BEHAVIORAL HEALTH - RISK ASSESSMENT
Protective factors include no suicide attempts, no violence history, supportive family, willingness to seek help, no suicidal ideation or homicidal ideation, hopefulness for future.   Chronc risk factors include: substance abuse, hx of admissions, hx of mood dysregulation, recent break up    Pt not at imminent elevated risk at this time given lack of suicidality. Pt is no longer intoxicated, is not actively psychotic or manic and is not a current danger to self or others necessitating involuntary hospitalization.    Patient does remain at elevated risk due these factors, however she denies wanting to harm herself or others.  She could benefit from proper medication administration and management along with possible substance treatment. To be further explored with patient throughout stay.

## 2018-11-28 NOTE — DISCHARGE NOTE ADULT - PLAN OF CARE
SIGNED OUT AGAINST MEDICAL ADVICE You were seen by Psychiatry in-hospital and recommended for further monitoring and assessment. You have refused further intervention and signed out against medical advice. Please follow up at [facility] [phone] (M-F 9am-7pm) and/or [facility] [phone] for assistance for abstinence of alcohol withdrawal. You were seen by Psychiatry in-hospital and recommended for further monitoring and assessment. You have refused further intervention and signed out against medical advice. Please follow up at [facility] [phone] (M-F 9am-7pm) and/or [facility] [phone] for further management.

## 2018-11-28 NOTE — PROGRESS NOTE ADULT - SUBJECTIVE AND OBJECTIVE BOX
Patient is a 38y old  Female who presents with a chief complaint of EtOH withdrawal (28 Nov 2018 11:24)      SUBJECTIVE / OVERNIGHT EVENTS:  Patient seen and examined this morning around 10:30am. Patient's sister and daughter present at bedside. Patient adamant that she wants to leave even if it's against medical advice. Patient states her last drink was about one week ago and was without any visual or auditory hallucinations at time of examination. Patient denies SI or HI. No fever, chest pain, SOB. Patient without palpitations, nausea or abdominal pain.     Spoke with pt's therapist, Nabila Rodrigues [phone] who expressed concern for patient's behavior. Nabila referred her to the hospital in attempts for the patient to get inpatient psychiatric hospitalization since pt has been "delusional." Nabila mentioned that patient has bipolar disorder and personality disorder, which is likely contributing to her behavior.     MEDICATIONS  (STANDING):  clonazePAM Tablet 1 milliGRAM(s) Oral daily  folic acid 1 milliGRAM(s) Oral daily  influenza   Vaccine 0.5 milliLiter(s) IntraMuscular once  multivitamin 1 Tablet(s) Oral daily  QUEtiapine 100 milliGRAM(s) Oral daily  thiamine IVPB 500 milliGRAM(s) IV Intermittent daily    MEDICATIONS  (PRN):  acetaminophen   Tablet .. 650 milliGRAM(s) Oral every 6 hours PRN Temp greater or equal to 38C (100.4F), Mild Pain (1 - 3), Moderate Pain (4 - 6), Severe Pain (7 - 10)  clonazePAM Tablet 0.5 milliGRAM(s) Oral daily PRN Anxiety  LORazepam     Tablet 2 milliGRAM(s) Oral every 2 hours PRN CIWA-Ar score increase by 2 points and a total score of 7 or less  propranolol 40 milliGRAM(s) Oral two times a day PRN Anxiety      Vital Signs Last 24 Hrs  T(C): 36.8 (28 Nov 2018 09:00), Max: 37 (27 Nov 2018 19:09)  T(F): 98.2 (28 Nov 2018 09:00), Max: 98.6 (27 Nov 2018 19:09)  HR: 80 (28 Nov 2018 09:00) (71 - 86)  BP: 113/61 (28 Nov 2018 09:00) (99/55 - 126/75)  BP(mean): --  RR: 16 (28 Nov 2018 09:00) (16 - 18)  SpO2: 98% (28 Nov 2018 09:00) (96% - 100%)      PHYSICAL EXAM  GENERAL: NAD, well-developed  CHEST/LUNG: Clear to auscultation bilaterally; No wheeze  HEART: Regular rate and rhythm; No murmurs, rubs, or gallops  ABDOMEN: Soft, Nontender, Nondistended; Bowel sounds present  EXTREMITIES:  2+ Peripheral Pulses, No clubbing, cyanosis, or edema  PSYCH: AAOx3, coherent speech, anxious/frustrated - labile temperament. Comprehension intact      LABS:                        12.9   6.33  )-----------( 213      ( 28 Nov 2018 07:30 )             38.2     11-28    139  |  106  |  14  ----------------------------<  102<H>  4.1   |  23  |  0.81    Ca    8.3<L>      28 Nov 2018 07:30  Phos  3.7     11-28  Mg     2.2     11-28    TPro  7.4  /  Alb  4.7  /  TBili  0.5  /  DBili  x   /  AST  18  /  ALT  10  /  AlkPhos  61  11-27            Consultant(s) Notes Reviewed:  Yes  Care Discussed with Consultants/Other Providers: Yes, psychiatry team

## 2018-11-28 NOTE — CHART NOTE - NSCHARTNOTEFT_GEN_A_CORE
ISTOP # [id number]    Patient Name:	Cora Lopez	YOB: 1980  Address:	[address] [place] [address]	Sex:	Female  Rx Written	Rx Dispensed	Drug	Quantity	Days Supply	Prescriber Name  11/14/2018	11/23/2018	clonazepam 0.5 mg tablet	30	30	Shilpa Lorenzana (MD)  11/08/2018	11/10/2018	clonazepam 0.5 mg tablet	15	30	Shilpa Lorenzana (MD)  11/08/2018	11/10/2018	alprazolam 0.5 mg tablet	120	30	Shilpa Lorenzana (MD)  10/11/2018	10/13/2018	alprazolam 1 mg tablet	60	30	DoryGonzalez MD  09/12/2018	09/13/2018	alprazolam 1 mg tablet	60	30	DorGonzalez english MD  08/08/2018	08/21/2018	alprazolam 1 mg tablet	20	20	DoryGonzalez MD  07/20/2018	07/25/2018	alprazolam 1 mg tablet	20	20	DoryGonzalez MD  06/04/2018	06/07/2018	alprazolam 1 mg tablet	20	20	DoryGonzalez MD  06/01/2018	06/01/2018	tramadol hcl 50 mg tablet	30	10	Alexandre Carroll MD  05/29/2018	05/29/2018	acetaminophen-cod #3 tablet	30	10	Alexandre Carroll MD  05/16/2018	05/17/2018	alprazolam 1 mg tablet	20	20	DoryGonzalez MD  02/28/2018	03/06/2018	alprazolam 1 mg tablet	20	20	DorGonzalez english MD PGY-2  Pager # [phone]/ [phone]

## 2018-11-28 NOTE — DISCHARGE NOTE ADULT - CARE PLAN
Principal Discharge DX:	Alcohol withdrawal  Goal:	SIGNED OUT AGAINST MEDICAL ADVICE  Assessment and plan of treatment:	You were seen by Psychiatry in-hospital and recommended for further monitoring and assessment. You have refused further intervention and signed out against medical advice. Please follow up at [facility] [phone] (M-F 9am-7pm) and/or [facility] [phone] for assistance for abstinence of alcohol withdrawal.  Secondary Diagnosis:	Bipolar affective disorder, current episode depressed, current episode severity unspecified  Goal:	SIGNED OUT AGAINST MEDICAL ADVICE  Assessment and plan of treatment:	You were seen by Psychiatry in-hospital and recommended for further monitoring and assessment. You have refused further intervention and signed out against medical advice. Please follow up at [facility] [phone] (M-F 9am-7pm) and/or [facility] [phone] for further management.

## 2018-11-28 NOTE — PROGRESS NOTE BEHAVIORAL HEALTH - NSBHFUPINTERVALHXFT_PSY_A_CORE
Ms. Lopez received one symtpom triggered ativan dose overnight with CIWA scores of 7, 4, 6, 2 and 1 overnight. She reports feeling better after receiving ativan, but is now feeling shaky again with alternating cold/hot sweats. She currently feels anxious and has a diffuse headache. Her primary concern is her blurry vision, which she describes as "textures moving upward" and includes seeing dots or ants crawling up the walls. She begins to tell the examiner a circumstantial story about hallucinations of people she was having at home prior to hospitalization and worries that her bipolar disorder is progressing to schizophrenia. The patient understands that her symptoms are likely exacerbated by recent, heavy alcohol use and she would like to be home as soon as possible without the need for additional rehab/psychiatric hospitalization. Pt denies SI/HI and AH. She reports feeling hungry and is waiting for breakfast.

## 2018-11-28 NOTE — DISCHARGE NOTE ADULT - MEDICATION SUMMARY - MEDICATIONS TO TAKE
I will START or STAY ON the medications listed below when I get home from the hospital:    propranolol 40 mg oral tablet  -- 1 tab(s) by mouth 2 times a day, As Needed anxiety  -- Indication: For Anxiety    clonazePAM 0.5 mg oral tablet  -- 2 tab(s) by mouth once a day (at bedtime)  -- Indication: For Anxiety    SEROquel 100 mg oral tablet  -- 1 tab(s) by mouth once a day (at bedtime)  -- Indication: For Anxiety    Multiple Vitamins oral tablet  -- 1 tab(s) by mouth once a day  -- Indication: For Supplement    folic acid 1 mg oral tablet  -- 1 tab(s) by mouth once a day  -- Indication: For Supplement    thiamine 100 mg oral tablet  -- 1 tab(s) by mouth once a day   -- Indication: For Supplement

## 2018-11-28 NOTE — PROGRESS NOTE BEHAVIORAL HEALTH - CASE SUMMARY
patient is a 38 year old F with bipolar d/o (?), admitted for etoh withdrawal in context of breakup 3 weeks ago. CIWA scores have been low when seen, on exam no tremors, N/V, diaphoresis, AMS, or AH/VH though patient did admit to prior providers of seeing complex hallucinations over past 1mo during period of high etoh use. patient has histrionic and borderline traits given mood lability, inappropriate anger, splitting behaviors, endorsement of symptoms that are seemingly fantastical and do not align with psychosis. Patient has outpatient f/u with psychologist and psychiatrist, left message with Dr. Lorenzana [phone] appraising of situation. No Si/I/p or Hi/I/P, future oriented, no c/i to discharge or need for psych admission.

## 2018-11-28 NOTE — DISCHARGE NOTE ADULT - PATIENT PORTAL LINK FT
You can access the [facility] Patient Portal, offered by [facility], by registering with the following website: [email or web address]

## 2018-11-28 NOTE — PROGRESS NOTE ADULT - PROBLEM SELECTOR PLAN 5
Despite prolonged discussion with patient with her sister, mother and member from inpatient psych team, patient left AMA. Forms signed. Spent 40 min.

## 2018-11-28 NOTE — PROGRESS NOTE ADULT - PROBLEM SELECTOR PLAN 1
Patient w/o prior hx of alcohol withdrawal and states last drink was about 1 week ago. Patient able to express risk of seizure, coma, or possible death from severe alcohol withdrawal and wants to accept the risk of leaving AMA despite further monitoring.

## 2018-11-28 NOTE — PROGRESS NOTE ADULT - ASSESSMENT
39y/o F with PMH Bipolar disorder admitted for EtOH withdrawal with auditory and visual hallucinations. Patient hemodynamically stable and demonstrates capacity to leave AMA.

## 2018-12-10 PROBLEM — F31.9 BIPOLAR DISORDER, UNSPECIFIED: Chronic | Status: ACTIVE | Noted: 2018-11-27

## 2018-12-10 PROBLEM — F31.9 BIPOLAR DISORDER, UNSPECIFIED: Chronic | Status: ACTIVE | Noted: 2018-11-24

## 2018-12-12 ENCOUNTER — RX RENEWAL (OUTPATIENT)
Age: 38
End: 2018-12-12

## 2018-12-12 RX ORDER — ALPRAZOLAM 1 MG/1
1 TABLET ORAL
Qty: 60 | Refills: 0 | Status: DISCONTINUED | COMMUNITY
Start: 2018-02-28 | End: 2018-12-12

## 2018-12-14 ENCOUNTER — APPOINTMENT (OUTPATIENT)
Dept: FAMILY MEDICINE | Facility: CLINIC | Age: 38
End: 2018-12-14

## 2018-12-26 NOTE — ED PROVIDER NOTE - CPE EDP ENMT NORM
How to Use and Care for Your PEG Tube   WHAT YOU NEED TO KNOW:   A percutaneous endoscopic gastrostomy (PEG) tube is a plastic tube that is put into your stomach through your skin  PEG tubes are most often used to give you food or liquids if you are not able to eat or drink  Medical formula, medicines, and water can be given through a PEG tube  DISCHARGE INSTRUCTIONS:   Return to the emergency department if:   · You start coughing or vomiting during or after a feeding  · You have severe abdominal pain  · Blood or tube feeding fluid leaks from the PEG tube site  · Your PEG tube is shorter than it was when it was put in     · Your PEG tube comes out  · Your mouth feels dry, your heart feels like it is beating too fast, or you feel weak  Contact your healthcare provider if:   · You have nausea, diarrhea, or abdominal bloating or discomfort  · You have stomach pain after each feeding or when you move around  · You have discomfort or pain around your PEG tube site  · The skin around your PEG tube is red, swollen, or draining pus  · You weigh less than your healthcare provider says you should  · Your PEG tube is longer than it was when it was put in     · You have questions or concerns about your condition or care  How to use your PEG tube: Your healthcare provider will tell you when and how often to use your PEG tube for feedings  You may need a bolus, intermittent, or continuous feeding  A bolus feeding is when formula is give over a short period of time  An intermittent feeding is scheduled for certain times throughout the day  Continuous feedings run all the time  Ask your healthcare provider which method is best for you:  · Use a feeding syringe  Connect the feeding syringe to the end of the PEG tube  Pour the correct amount of formula into the syringe  Hold the syringe up high  Formula will flow into the PEG tube   The syringe plunger may be used to gently push the last of the formula through the PEG tube  · Use a gravity drip bag  Connect the tubing from the gravity drip bag to the end of the PEG tube  Pour the formula into a gravity drip bag  Hang the bag on a medical pole, a , or other device  Adjust the flow rate on the tubing according to your healthcare provider's instructions  Formula will flow into the PEG tube  Ask how long it should take to complete your feeding  · Use a feeding pump  You may use an electric pump to control the flow of the formula into your PEG tube  Your healthcare provider will teach you how to set up and use the pump  How to care for your PEG tube:   · Always flush your PEG tube before and after each use  This helps prevent blockage from formula or medicine  Use at least 2 tablespoons (30 milliliters) of water to flush the tube  Follow directions for flushing your PEG tube  · If your PEG tube becomes clogged, try to unclog it as soon as you can  Flush your PEG tube with a 60 milliliter (mL) syringe filled with warm water  Never use a wire to unclog the tube  A wire can poke a hole in the tube  Your healthcare provider may have you use a special medicine or a plastic brush to help unclog your tube  · Check the PEG tube daily  ¨ Check the length of the tube from the end to where it goes into your body  If it gets longer, it may be at risk for coming out  If it gets shorter, let your healthcare provider know right away  ¨ Check the bumper  (piece that goes around the tube, next to your skin)  It should be snug against your skin  Tell your healthcare provider if the bumper seems too tight or too loose  · Use an alcohol pad to clean the end of your PEG tube  Do this before you connect tubing or a syringe to your PEG tube and after you remove it  When you disconnect tubing or a syringe from your PEG tube, do not let the end of the PEG tube touch anything  How do I care for the skin around my PEG tube?    · Do not remove the stitches or medical tape that hold your PEG tube in place  when you first get it  Your healthcare provider will take them off once the skin around your tube heals  Leave clean bandages over the tube area for the first 24 hours after the tube is put in  You may not need to use bandages after 24 hours if the skin around the tube looks dry  Ask when you can shower or bathe  · Routine skin care:      ¨ Clean the skin around your tube 1 to 2 times each day  Ask your healthcare provider what you should use to clean your skin  Check for redness and swelling in the area where the tube goes into your body  Check for fluid draining from your stoma (the hole where the tube was put in)  ¨ Gently turn your tube daily  after your stitches come out  This may decrease pressure on your skin under the bumper  It may also help prevent an infection  ¨ Keep the skin around your PEG tube dry  This will help prevent skin irritation and infection  · Use topical medicines as directed  You may need to put antibiotic cream on the skin around your tube after you are done cleaning it  Other tips to know about a PEG tube:   · You may need to keep track of how much formula and other liquids you have each day  You may also need to keep track of how much you urinate and how many times you have a bowel movement each day  Bring this record to your follow-up visits  · You may need to check your weight daily or weekly  Keep a record of your weights and bring it to your follow-up visits  Your healthcare provider may need to change your feedings if your weight changes too quickly  · Take your medicines as directed  Learn which of your medicines can be crushed, mixed with water, and given through the PEG tube  Certain medicines should not be crushed or may clog the PEG tube  · Go to all follow-up appointments  You may need to have blood tests and other tests when you see your healthcare provider    © 2017 [facility] [address] is for End User's use only and may not be sold, redistributed or otherwise used for commercial purposes  All illustrations and images included in CareNotes® are the copyrighted property of A D A M [employer] Inc  or Jason Chiu  The above information is an  only  It is not intended as medical advice for individual conditions or treatments  Talk to your doctor, nurse or pharmacist before following any medical regimen to see if it is safe and effective for you  normal...

## 2018-12-29 ENCOUNTER — EMERGENCY (EMERGENCY)
Facility: HOSPITAL | Age: 38
LOS: 0 days | Discharge: ANOTHER HOSPITAL | End: 2018-12-30
Attending: EMERGENCY MEDICINE | Admitting: EMERGENCY MEDICINE
Payer: MEDICAID

## 2018-12-29 VITALS
RESPIRATION RATE: 16 BRPM | TEMPERATURE: 98 F | HEIGHT: 63 IN | SYSTOLIC BLOOD PRESSURE: 127 MMHG | WEIGHT: 125 LBS | OXYGEN SATURATION: 92 % | DIASTOLIC BLOOD PRESSURE: 83 MMHG | HEART RATE: 102 BPM

## 2018-12-29 DIAGNOSIS — Z79.899 OTHER LONG TERM (CURRENT) DRUG THERAPY: ICD-10-CM
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
## 2018-12-29 DIAGNOSIS — F32.9 MAJOR DEPRESSIVE DISORDER, SINGLE EPISODE, UNSPECIFIED: ICD-10-CM

## 2018-12-29 DIAGNOSIS — M67.912 UNSPECIFIED DISORDER OF SYNOVIUM AND TENDON, LEFT SHOULDER: Chronic | ICD-10-CM

## 2018-12-29 DIAGNOSIS — F10.929 ALCOHOL USE, UNSPECIFIED WITH INTOXICATION, UNSPECIFIED: ICD-10-CM

## 2018-12-29 DIAGNOSIS — F31.9 BIPOLAR DISORDER, UNSPECIFIED: ICD-10-CM

## 2018-12-29 DIAGNOSIS — F10.129 ALCOHOL ABUSE WITH INTOXICATION, UNSPECIFIED: ICD-10-CM

## 2018-12-29 PROCEDURE — 99285 EMERGENCY DEPT VISIT HI MDM: CPT | Mod: 25

## 2018-12-29 RX ORDER — SODIUM CHLORIDE 9 MG/ML
1000 INJECTION INTRAMUSCULAR; INTRAVENOUS; SUBCUTANEOUS
Qty: 0 | Refills: 0 | Status: DISCONTINUED | OUTPATIENT
Start: 2018-12-29 | End: 2018-12-29

## 2018-12-29 NOTE — ED ADULT NURSE NOTE - NSIMPLEMENTINTERV_GEN_ALL_ED
Implemented All Fall Risk Interventions:  [place] to call system. Call bell, personal items and telephone within reach. Instruct patient to call for assistance. Room bathroom lighting operational. Non-slip footwear when patient is off stretcher. Physically safe environment: no spills, clutter or unnecessary equipment. Stretcher in lowest position, wheels locked, appropriate side rails in place. Provide visual cue, wrist band, yellow gown, etc. Monitor gait and stability. Monitor for mental status changes and reorient to person, place, and time. Review medications for side effects contributing to fall risk. Reinforce activity limits and safety measures with patient and family.

## 2018-12-29 NOTE — ED PROVIDER NOTE - OBJECTIVE STATEMENT
37 y/o female  in ED with family c/o depression with alcohol intake today.   family states pt with h/o depression and alcohol abuse.   pt denies any suicidal or homicidal ideations.   pt denies any fever, HA, cp, sob, n/v/d/abd pain.   family states "She needs help"

## 2018-12-29 NOTE — ED ADULT TRIAGE NOTE - CHIEF COMPLAINT QUOTE
Pt BIBEMS from [facility], sister called EMS, with hx bipolar disorder, after "drinking a bottle of tequila".

## 2018-12-29 NOTE — ED ADULT NURSE NOTE - OBJECTIVE STATEMENT
patient sitting up in stretcher in no acute distress. A&Ox3. states she has been very depressed recently and has increased her ETOH use. patient states she drank a bottle of tequila today. denies SI, HI and hallucinations. patient quiet and avoiding eye contact. color good. skin warm and dry. respirations even and unlabored. family at bedside.

## 2018-12-29 NOTE — ED ADULT NURSE NOTE - CHIEF COMPLAINT QUOTE
Pt BIBEMS from [facility], sister called EMS, with hx bipolar disorder, after "drinking a bottle of tequila".

## 2018-12-30 VITALS
OXYGEN SATURATION: 100 % | SYSTOLIC BLOOD PRESSURE: 145 MMHG | HEART RATE: 100 BPM | DIASTOLIC BLOOD PRESSURE: 97 MMHG | TEMPERATURE: 99 F | RESPIRATION RATE: 18 BRPM

## 2018-12-30 DIAGNOSIS — R69 ILLNESS, UNSPECIFIED: ICD-10-CM

## 2018-12-30 DIAGNOSIS — F10.29 ALCOHOL DEPENDENCE WITH UNSPECIFIED ALCOHOL-INDUCED DISORDER: ICD-10-CM

## 2018-12-30 DIAGNOSIS — F10.230 ALCOHOL DEPENDENCE WITH WITHDRAWAL, UNCOMPLICATED: ICD-10-CM

## 2018-12-30 LAB
ALBUMIN SERPL ELPH-MCNC: 4 G/DL — SIGNIFICANT CHANGE UP (ref 3.3–5)
ALP SERPL-CCNC: 55 U/L — SIGNIFICANT CHANGE UP (ref 40–120)
ALT FLD-CCNC: 37 U/L — SIGNIFICANT CHANGE UP (ref 12–78)
AMPHET UR-MCNC: NEGATIVE — SIGNIFICANT CHANGE UP
ANION GAP SERPL CALC-SCNC: 10 MMOL/L — SIGNIFICANT CHANGE UP (ref 5–17)
ANION GAP SERPL CALC-SCNC: 6 MMOL/L — SIGNIFICANT CHANGE UP (ref 5–17)
APAP SERPL-MCNC: < 2 UG/ML (ref 10–30)
APPEARANCE UR: CLEAR — SIGNIFICANT CHANGE UP
AST SERPL-CCNC: 42 U/L — HIGH (ref 15–37)
BARBITURATES UR SCN-MCNC: NEGATIVE — SIGNIFICANT CHANGE UP
BASOPHILS # BLD AUTO: 0.13 K/UL — SIGNIFICANT CHANGE UP (ref 0–0.2)
BASOPHILS NFR BLD AUTO: 1.5 % — SIGNIFICANT CHANGE UP (ref 0–2)
BENZODIAZ UR-MCNC: NEGATIVE — SIGNIFICANT CHANGE UP
BILIRUB DIRECT SERPL-MCNC: 0.2 MG/DL — SIGNIFICANT CHANGE UP (ref 0–0.2)
BILIRUB INDIRECT FLD-MCNC: 0.5 MG/DL — SIGNIFICANT CHANGE UP (ref 0.2–1)
BILIRUB SERPL-MCNC: 0.7 MG/DL — SIGNIFICANT CHANGE UP (ref 0.2–1.2)
BILIRUB UR-MCNC: NEGATIVE — SIGNIFICANT CHANGE UP
BUN SERPL-MCNC: 15 MG/DL — SIGNIFICANT CHANGE UP (ref 7–23)
BUN SERPL-MCNC: 9 MG/DL — SIGNIFICANT CHANGE UP (ref 7–23)
CALCIUM SERPL-MCNC: 7.8 MG/DL — LOW (ref 8.5–10.1)
CALCIUM SERPL-MCNC: 8.6 MG/DL — SIGNIFICANT CHANGE UP (ref 8.5–10.1)
CHLORIDE SERPL-SCNC: 108 MMOL/L — SIGNIFICANT CHANGE UP (ref 96–108)
CHLORIDE SERPL-SCNC: 99 MMOL/L — SIGNIFICANT CHANGE UP (ref 96–108)
CO2 SERPL-SCNC: 27 MMOL/L — SIGNIFICANT CHANGE UP (ref 22–31)
CO2 SERPL-SCNC: 29 MMOL/L — SIGNIFICANT CHANGE UP (ref 22–31)
COCAINE METAB.OTHER UR-MCNC: NEGATIVE — SIGNIFICANT CHANGE UP
COLOR SPEC: YELLOW — SIGNIFICANT CHANGE UP
CREAT SERPL-MCNC: 0.76 MG/DL — SIGNIFICANT CHANGE UP (ref 0.5–1.3)
CREAT SERPL-MCNC: 0.89 MG/DL — SIGNIFICANT CHANGE UP (ref 0.5–1.3)
DIFF PNL FLD: NEGATIVE — SIGNIFICANT CHANGE UP
EOSINOPHIL # BLD AUTO: 0.16 K/UL — SIGNIFICANT CHANGE UP (ref 0–0.5)
EOSINOPHIL NFR BLD AUTO: 1.9 % — SIGNIFICANT CHANGE UP (ref 0–6)
ETHANOL SERPL-MCNC: 335 MG/DL — HIGH (ref 0–10)
ETHANOL SERPL-MCNC: 80 MG/DL — HIGH (ref 0–10)
GLUCOSE SERPL-MCNC: 136 MG/DL — HIGH (ref 70–99)
GLUCOSE SERPL-MCNC: 89 MG/DL — SIGNIFICANT CHANGE UP (ref 70–99)
GLUCOSE UR QL: NEGATIVE MG/DL — SIGNIFICANT CHANGE UP
HCG SERPL-ACNC: <1 MIU/ML — SIGNIFICANT CHANGE UP
HCT VFR BLD CALC: 47.6 % — HIGH (ref 34.5–45)
HGB BLD-MCNC: 15.9 G/DL — HIGH (ref 11.5–15.5)
IMM GRANULOCYTES NFR BLD AUTO: 0.4 % — SIGNIFICANT CHANGE UP (ref 0–1.5)
KETONES UR-MCNC: NEGATIVE — SIGNIFICANT CHANGE UP
LEUKOCYTE ESTERASE UR-ACNC: NEGATIVE — SIGNIFICANT CHANGE UP
LYMPHOCYTES # BLD AUTO: 3.96 K/UL — HIGH (ref 1–3.3)
LYMPHOCYTES # BLD AUTO: 46.8 % — HIGH (ref 13–44)
MAGNESIUM SERPL-MCNC: 1.6 MG/DL — SIGNIFICANT CHANGE UP (ref 1.6–2.6)
MCHC RBC-ENTMCNC: 33.4 GM/DL — SIGNIFICANT CHANGE UP (ref 32–36)
MCHC RBC-ENTMCNC: 34.6 PG — HIGH (ref 27–34)
MCV RBC AUTO: 103.7 FL — HIGH (ref 80–100)
METHADONE UR-MCNC: NEGATIVE — SIGNIFICANT CHANGE UP
MONOCYTES # BLD AUTO: 0.48 K/UL — SIGNIFICANT CHANGE UP (ref 0–0.9)
MONOCYTES NFR BLD AUTO: 5.7 % — SIGNIFICANT CHANGE UP (ref 2–14)
NEUTROPHILS # BLD AUTO: 3.7 K/UL — SIGNIFICANT CHANGE UP (ref 1.8–7.4)
NEUTROPHILS NFR BLD AUTO: 43.7 % — SIGNIFICANT CHANGE UP (ref 43–77)
NITRITE UR-MCNC: NEGATIVE — SIGNIFICANT CHANGE UP
NRBC # BLD: 0 /100 WBCS — SIGNIFICANT CHANGE UP (ref 0–0)
OPIATES UR-MCNC: NEGATIVE — SIGNIFICANT CHANGE UP
PCP SPEC-MCNC: SIGNIFICANT CHANGE UP
PCP UR-MCNC: NEGATIVE — SIGNIFICANT CHANGE UP
PH UR: 6 — SIGNIFICANT CHANGE UP (ref 5–8)
PHOSPHATE SERPL-MCNC: 3.1 MG/DL — SIGNIFICANT CHANGE UP (ref 2.5–4.5)
PLATELET # BLD AUTO: 228 K/UL — SIGNIFICANT CHANGE UP (ref 150–400)
POTASSIUM SERPL-MCNC: 3.6 MMOL/L — SIGNIFICANT CHANGE UP (ref 3.5–5.3)
POTASSIUM SERPL-MCNC: 4.9 MMOL/L — SIGNIFICANT CHANGE UP (ref 3.5–5.3)
POTASSIUM SERPL-SCNC: 3.6 MMOL/L — SIGNIFICANT CHANGE UP (ref 3.5–5.3)
POTASSIUM SERPL-SCNC: 4.9 MMOL/L — SIGNIFICANT CHANGE UP (ref 3.5–5.3)
PROT SERPL-MCNC: 7.3 GM/DL — SIGNIFICANT CHANGE UP (ref 6–8.3)
PROT UR-MCNC: NEGATIVE MG/DL — SIGNIFICANT CHANGE UP
RBC # BLD: 4.59 M/UL — SIGNIFICANT CHANGE UP (ref 3.8–5.2)
RBC # FLD: 13.1 % — SIGNIFICANT CHANGE UP (ref 10.3–14.5)
SALICYLATES SERPL-MCNC: <1.7 MG/DL (ref 2.8–20)
SODIUM SERPL-SCNC: 136 MMOL/L — SIGNIFICANT CHANGE UP (ref 135–145)
SODIUM SERPL-SCNC: 143 MMOL/L — SIGNIFICANT CHANGE UP (ref 135–145)
SP GR SPEC: 1.01 — SIGNIFICANT CHANGE UP (ref 1.01–1.02)
THC UR QL: NEGATIVE — SIGNIFICANT CHANGE UP
UROBILINOGEN FLD QL: NEGATIVE MG/DL — SIGNIFICANT CHANGE UP
WBC # BLD: 8.46 K/UL — SIGNIFICANT CHANGE UP (ref 3.8–10.5)
WBC # FLD AUTO: 8.46 K/UL — SIGNIFICANT CHANGE UP (ref 3.8–10.5)

## 2018-12-30 PROCEDURE — 93010 ELECTROCARDIOGRAM REPORT: CPT

## 2018-12-30 RX ORDER — CLONAZEPAM 1 MG
0 TABLET ORAL
Qty: 0 | Refills: 0 | COMMUNITY

## 2018-12-30 RX ORDER — NICOTINE POLACRILEX 2 MG
1 GUM BUCCAL DAILY
Qty: 0 | Refills: 0 | Status: DISCONTINUED | OUTPATIENT
Start: 2018-12-30 | End: 2018-12-29

## 2018-12-30 RX ORDER — QUETIAPINE FUMARATE 200 MG/1
0 TABLET, FILM COATED ORAL
Qty: 0 | Refills: 0 | COMMUNITY

## 2018-12-30 RX ORDER — PROPRANOLOL HCL 160 MG
0 CAPSULE, EXTENDED RELEASE 24HR ORAL
Qty: 0 | Refills: 0 | COMMUNITY

## 2018-12-30 RX ADMIN — Medication 2 MILLIGRAM(S): at 04:23

## 2018-12-30 RX ADMIN — SODIUM CHLORIDE 1000 MILLILITER(S): 9 INJECTION INTRAMUSCULAR; INTRAVENOUS; SUBCUTANEOUS at 07:30

## 2018-12-30 RX ADMIN — Medication 1 PATCH: at 03:02

## 2018-12-30 RX ADMIN — Medication 1 PATCH: at 15:29

## 2018-12-30 RX ADMIN — Medication 1 MILLIGRAM(S): at 15:28

## 2018-12-30 RX ADMIN — Medication 1 MILLIGRAM(S): at 18:26

## 2018-12-30 RX ADMIN — Medication 2 MILLIGRAM(S): at 13:29

## 2018-12-30 RX ADMIN — SODIUM CHLORIDE 125 MILLILITER(S): 9 INJECTION INTRAMUSCULAR; INTRAVENOUS; SUBCUTANEOUS at 00:26

## 2018-12-30 RX ADMIN — Medication 1 MILLIGRAM(S): at 03:02

## 2018-12-30 NOTE — ED BEHAVIORAL HEALTH ASSESSMENT NOTE - DETAILS
mother-depression aunt anxiety no family suicide case closed, called when mother sent daughter to school with allergy meds, teachers did not know what it was na Sister mother-depression; aunt anxiety; no family suicide.

## 2018-12-30 NOTE — ED BEHAVIORAL HEALTH ASSESSMENT NOTE - REFERRAL / APPOINTMENT DETAILS
Pending face to face collateral from sister. Patient to be transferred to [facility] detox for alcohol withdrawal. Patient to be transferred to [facility] detox for alcohol withdrawal. Per ED staff patient left ED around 8pm for transport to [facility] detox.

## 2018-12-30 NOTE — ED BEHAVIORAL HEALTH ASSESSMENT NOTE - DESCRIPTION
has 10 yo daughter who lives in [facility] with ex-. Calm  Vital Signs Last 24 Hrs  T(C): 37.1 (30 Dec 2018 06:59), Max: 37.1 (30 Dec 2018 06:59)  T(F): 98.8 (30 Dec 2018 06:59), Max: 98.8 (30 Dec 2018 06:59)  HR: 105 (30 Dec 2018 06:59) (98 - 105)  BP: 123/90 (30 Dec 2018 06:59) (123/90 - 129/90)  BP(mean): --  RR: 18 (30 Dec 2018 06:59) (16 - 18)  SpO2: 97% (30 Dec 2018 06:59) (92% - 100%) none  has 10 yo daughter who lives in [facility] with ex-.

## 2018-12-30 NOTE — ED ADULT NURSE REASSESSMENT NOTE - NS ED NURSE REASSESS COMMENT FT1
patient medicated as ordered. belongings secured with security. CO maintained.  patient cooperative. patient aware of plan of care.

## 2018-12-30 NOTE — ED BEHAVIORAL HEALTH ASSESSMENT NOTE - SUMMARY
38 year old, , non-caregiver, with a hx of bipolar admitted for excessive intoxication; brought to the ED by EMS after siter (Kaya) activated EMS.    PLAN:  Patent is currently denying current psychiatric symptomatology to warrant involuntary psychiatric admission. She minimizes her drinking behavior, and is not interested in attending inpatient rehab at this time. Will finalize her disposition  after speaking with sister who is on her way to the ED.  If sister has no significant safety concerns, will discharge patient home. 38 year old, , non-caregiver, with a hx of bipolar admitted for excessive intoxication; brought to the ED by EMS after siter (Kaya) activated EMS.      Patent is currently denying current psychiatric symptomatology to warrant involuntary psychiatric admission. She originally minimizes her drinking behavior, and was not interested in attending inpatient detox or rehab. However, after some motivational interviewing, she was able to change her mind and admitted to having a severe drinking problem. She agrees to go to [facility] for detox then rehab.  Spoke to sister face to face in the ED who states she has no safety concerns to report other than her sister drinking to much, which is affecting her ability to care for her daughter. Sister states patient needs detox and long term rehab to help her achieve sobriety. She is very supportive and hopes to find a detox/rehab bed at [facility] for her sister.    At this time, there are no acute safety concerns to warrant psychiatric admission. She denies current suicidality, homicidality, alba, psychosis and will not benefit from inpatient psychiatric admission.  Patient is stable for transfer to [facility] detox. Case discussed with attending psychiatrist who is in agreement with the plan. 38 year old, , non-caregiver, without any medical history or surgical history, with a hx of post-partum depression/bipolar disorder; with one prior voluntary inpatient psychiatric admission; brought to the ED by EMS after sister (Kaya) activated EMS.    Patent is currently denying current psychiatric symptomatology to warrant involuntary psychiatric admission. She originally minimizes her drinking behavior, and was not interested in attending inpatient detox or rehab. However, after some motivational interviewing, she was able to change her mind and admitted to having a severe drinking problem. She agrees to go to [facility] for detox then rehab.  Spoke to sister face to face in the ED who states she has no safety concerns to report other than her sister drinking to much, which is affecting her ability to care for her daughter. Sister states patient needs detox and long term rehab to help her achieve sobriety. She is very supportive and hopes to find a detox/rehab bed at [facility] for her sister.    At this time, there are no acute safety concerns to warrant psychiatric admission. She denies current suicidality, homicidality, alba, psychosis and will not benefit from inpatient psychiatric admission.  Patient is stable for transfer to [facility] detox. Case discussed with attending psychiatrist who is in agreement with the plan.

## 2018-12-30 NOTE — ED BEHAVIORAL HEALTH ASSESSMENT NOTE - HPI (INCLUDE ILLNESS QUALITY, SEVERITY, DURATION, TIMING, CONTEXT, MODIFYING FACTORS, ASSOCIATED SIGNS AND SYMPTOMS)
This is a 38-year-old,  female, domiciles at the [facility] 5 weeks; unemployed , mother of an 12 y/o daughter who lives in [facility] with her ex-; with a history of pots-partum depression and bipolar disorder; without any history of suicidality, homicidality, psychosis; without prior suicide attempts; with 1 prior self-reported voluntary psychiatric hospitalizations years ago in [place] for medication management. Was treated at [facility] last month for alcohol intoxication; was treated and released. Was sent to the ED via EMS today after sister (Kaya) called 911.    Patient was seen at bedside in ED for discharge clearance per consult request. 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 Patient states she has no psychiatric history except for postpartum depression after having her daughter who is now 12 y/o. Patient states she was later diagnosed with bipolar, but has never been admitted involuntarily. States she was admitted on a voluntary status in [place] a while ago for medication management and was kept for only 48 hours. She denies current symptoms of depression; however, she reports sadness in context of relationship issues with mother. States "my mother is not talking to me or my sister". She states she has a primary care doctor she sees monthly and is prescribed Seroquel 100 mg at bedtime, Klonopin 0.5 mg daily and propranolol 20 mg twice daily. She states she has a psychiatrist, but can't afford her because she charges her $400 per visit and that she does not take insurance. She states her primary care doctor communicates with her psychiatrist who tells her PCP what to give her. She denies current withdrawal symptoms and states this is her first time in the hospital for alcohol intoxication, which is contrary to her record at [place], which shows she visited the hospital last  month when her mother brought her to the ED for alcohol intoxication. She appears to minimize her alcohol addition, noting "I don't have an alcohol problem". She further notes she has never been arrested, she works fulltime and takes care of her self. She denies any history of suicidal ideations, suicidal attempts, psychosis and denies current psychotic symptoms, alba or depressive symptoms; denies current intent or plans to die, citing her daughter and family as protective factors. She states she shares custody of her 12 y/o daughter with her ex and states her daughter was visiting [place]; however, per collateral form her sister (Kaya) who is on her way to the hospital, patient has a history of lying and notes her daughter is actually living in [place], and that she has not been able to take care of her because of her drinking habit.  Patient states she does not have a drinking problem, and is not interested in checking into rehab.   Chart review: Per record, in the past patient has seen therapist Nabila Rodrigues weekly, second therapist Thanh Caldwell monthly, and psychiatrist Dr Lorenzana. She states she is no longer seeing these psychiatric providers due to insurance issues.    COLLATERAL FROM SISTER: This is a 38-year-old,  female, -American,  non-caregiver, domiciles at the [facility] 5 weeks after being kicked out of her mother' house due to her drinking behavior; unemployed , mother of an 12 y/o daughter who lives in [facility] with her ex-; with a history of post-partum depression and bipolar disorder; without any history of suicidality, homicidality, psychosis; without prior suicide attempts; with 1 prior self-reported voluntary psychiatric hospitalizations years ago in [place] for medication management. Without any medical concerns; without current allergens; Was treated at [facility] last month for alcohol intoxication; was released home; refused rehab at the time. Was sent to the ED via EMS today after sister (Kaya) called 911 due to alcohol intoxication.     Patient was seen at bedside in ED for discharge clearance per consult request. 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 Patient states she is a member of the [employer] show and has her last show tomorrow. However, she is noted to be tremulous and tachycardic, but interacting appropriately with undersigned. Patient states she has no psychiatric history except for postpartum depression after having her daughter who is now 12 y/o. She reports a history of Bipolar, which is being treated with Seroquel 100 mg PO HS, which also helps her with sleep disturbances. Patient states she was diagnosed with bipolar during her 20's, but has never been admitted to inpatient psychiatry involuntarily. States she was admitted on a voluntary status in [place] a while ago for medication management and was kept for a short period. She denies current symptoms of depression; however, she reports sadness in context of relationship issues with mother who is angry at her about her drinking behavior. States "my mother is not talking to me". Last month patient was brought to [facility] by mother for alcohol withdrawal symptoms; however, she continues to drink after being released from the hospital. Patient states she has a primary care doctor she sees monthly and is prescribed Seroquel 100 mg at bedtime, Klonopin 0.5 mg daily and propranolol 20 mg twice daily. She states she has a psychiatrist, but can't afford her fees because she charges $400 per visit, and that she does not take her insurance, namely medicaid. She states her primary care doctor communicates with her psychiatrist who tells her PCP what to give her. She noted to exhibit current withdrawal symptoms, and has been placed on a CIWA with an Ativan protocol. Patient noted to minimize and drinking, stating this is her first time in a hospital for alcohol intoxication, which is contrary to her record at [place], which shows she visited the hospital last  month when her mother brought her to the ED for alcohol withdrawal. She appears to minimize her alcohol addition, noting "I don't have an alcohol problem". She further notes she has never been arrested, she works fulltime and takes care of her self. She denies any history of suicidal ideations, suicidal attempts, psychosis and denies current psychotic symptoms, alba or depressive symptoms; denies current intent or plans to die, citing her daughter and family as protective factors. She states she shares custody of her 12 y/o daughter with her ex and states her daughter was visiting [facility] with her ex, and will return for school in the ; however, per collateral form her sister (Kaya), patient has not been able to take care of her daughter and that her daughter is living with her ex- as result. Per sister Kaya, patient has also lost relationships and jobs due to her drinking habit. She has been reluctant to go to rehab and has minimized her alcohol addition. Spoke to patient about the potential health consequences of drinking too much. She was also advised of the risks of drinking and taking benzodiazepines including risks for accidental overdose. The importance of attending inpatient rehab post detox also discussed with her. Patient was able to change her mind and agreed to go to [facility]. She states she plans on going to rehab after detox. Patient was also made aware of other rehab opportunities including  Post rehab located at [address] [place] [address], which also has a a 3/4 long-term treatment center for those who wish to work while in treatment. Patient states she was interested and is exploring all her options. She appears very motivated to her sobriety and looks forward to attending [facility]. This is a 38-year-old,  female, -American,  non-caregiver, domiciles at the [facility] 5 weeks after being kicked out of her mother' house due to her drinking behavior; unemployed , mother of an 12 y/o daughter who lives in [facility] with her ex-; with a history of post-partum depression and bipolar disorder; without any history of suicidality, homicidality, psychosis; without prior suicide attempts; with 1 prior self-reported voluntary psychiatric hospitalizations years ago in [place] for medication management. Without any medical concerns; without current allergens; Was treated at [facility] last month for alcohol intoxication; was released home; refused rehab at the time. Was sent to the ED via EMS today after sister (Kaya) called 911 due to alcohol intoxication.     Patient was seen at bedside in ED for discharge clearance per consult request. 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 Patient states she is a member of the [employer] show and has her last show tomorrow. However, on reassessment, she is noted to be tremulous and tachycardic, but interacting appropriately with undersigned. Patient states she has no psychiatric history except for postpartum depression after having her daughter who is now 12 y/o. She reports a history of Bipolar, which is being treated with Seroquel 100 mg PO HS, which also helps her with sleep disturbances. Patient states she was diagnosed with bipolar during her 20's, but has never been admitted to inpatient psychiatry involuntarily. States she was admitted on a voluntary status in [place] a while ago for medication management and was kept for a short period. She denies current symptoms of depression; however, she reports sadness in context of relationship issues with mother who is angry at her about her drinking behavior. States "my mother is not talking to me". Last month patient was brought to [facility] by mother for alcohol withdrawal symptoms; however, she continues to drink after being released from the hospital. Patient states she has a primary care doctor she sees monthly and is prescribed Seroquel 100 mg at bedtime, Klonopin 0.5 mg daily and propranolol 20 mg twice daily. She states she has a psychiatrist, but can't afford her fees because she charges $400 per visit, and that she does not take her insurance, namely medicaid. She states her primary care doctor communicates with her psychiatrist who tells her PCP what to give her. She noted to exhibit current withdrawal symptoms, and has been placed on a CIWA with an Ativan protocol. Patient noted to minimize and drinking, stating this is her first time in a hospital for alcohol intoxication, which is contrary to her record at [place], which shows she visited the hospital last  month when her mother brought her to the ED for alcohol withdrawal. She appears to minimize her alcohol addition, noting "I don't have an alcohol problem". She further notes she has never been arrested, she works fulltime and takes care of her self. She denies any history of suicidal ideations, suicidal attempts, psychosis and denies current psychotic symptoms, alba or depressive symptoms; denies current intent or plans to die, citing her daughter and family as protective factors. She states she shares custody of her 12 y/o daughter with her ex and states her daughter was visiting [facility] with her ex, and will return for school in the ; however, per collateral form her sister (Kaya), patient has not been able to take care of her daughter and that her daughter is living with her ex- as result. Per sister Kaya, patient has also lost relationships and jobs due to her drinking habit. She has been reluctant to go to rehab and has minimized her alcohol addition. Spoke to patient about the potential health consequences of drinking too much. She was also advised of the risks of drinking and taking benzodiazepines including risks for accidental overdose. The importance of attending inpatient rehab post detox also discussed with her. Patient was able to change her mind and agreed to go to [facility]. She states she plans on going to rehab after detox. Patient was also made aware of other rehab opportunities including  Post rehab located at [address] [place] [address], which also has a a 3/4 long-term treatment center for those who wish to work while in treatment. Patient states she was interested and is exploring all her options. She appears very motivated to her sobriety and looks forward to attending [facility]. This is a 38-year-old,  female, -American,  non-caregiver, domiciles at the [facility] 5 weeks after being kicked out of her mother' house due to her drinking behavior; unemployed , mother of an 10 y/o daughter who lives in [facility] with her ex-; with a history of post-partum depression and bipolar disorder; without any history of suicidality, homicidality, psychosis; without prior suicide attempts; with 1 prior self-reported voluntary psychiatric hospitalizations years ago in [place] for medication management. Without any medical concerns; without current allergens; Was treated at [facility] last month for alcohol intoxication; was released home; refused rehab at the time. Was sent to the ED via EMS today after sister (aKya) called 911 due to alcohol intoxication.  around 12:11 am last night on arrival to ED.    Patient was seen at bedside in ED for discharge clearance per consult request. 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 Patient states she is a member of the [employer] show and has her last show tomorrow. However, on reassessment, she is noted to be tremulous and tachycardic, but interacting appropriately with undersigned. Patient states she has no psychiatric history except for postpartum depression after having her daughter who is now 10 y/o. She reports a history of Bipolar, which is being treated with Seroquel 100 mg PO HS, which also helps her with sleep disturbances. Patient states she was diagnosed with bipolar during her 20's, but has never been admitted to inpatient psychiatry involuntarily. States she was admitted on a voluntary status in [place] a while ago for medication management and was kept for a short period. She denies current symptoms of depression; however, she reports sadness in context of relationship issues with mother who is angry at her about her drinking behavior. States "my mother is not talking to me". Last month patient was brought to [facility] by mother for alcohol withdrawal symptoms; however, she continues to drink after being released from the hospital. Patient states she has a primary care doctor she sees monthly and is prescribed Seroquel 100 mg at bedtime, Klonopin 0.5 mg daily and propranolol 20 mg twice daily. She states she has a psychiatrist, but can't afford her fees because she charges $400 per visit, and that she does not take her insurance, namely medicaid. She states her primary care doctor communicates with her psychiatrist who tells her PCP what to give her. She noted to exhibit current withdrawal symptoms, and has been placed on a CIWA with an Ativan protocol. Patient noted to minimize and drinking, stating this is her first time in a hospital for alcohol intoxication, which is contrary to her record at [place], which shows she visited the hospital last  month when her mother brought her to the ED for alcohol withdrawal. She appears to minimize her alcohol addition, noting "I don't have an alcohol problem". She further notes she has never been arrested, she works fulltime and takes care of her self. She denies any history of suicidal ideations, suicidal attempts, psychosis and denies current psychotic symptoms, alba or depressive symptoms; denies current intent or plans to die, citing her daughter and family as protective factors. She states she shares custody of her 10 y/o daughter with her ex and states her daughter was visiting [facility] with her ex, and will return for school in the [place]; however, per collateral form her sister (Kaya), patient has not been able to take care of her daughter and that her daughter is living with her ex- as result. Per sister Kaya, patient has also lost relationships and jobs due to her drinking habit. She has been reluctant to go to rehab and has minimized her alcohol addition. Spoke to patient about the potential health consequences of drinking too much. She was also advised of the risks of drinking and taking benzodiazepines including risks for accidental overdose. The importance of attending inpatient rehab post detox also discussed with her. Patient was able to change her mind and agreed to go to [facility] detox. She states she plans on going to rehab after detox. Patient was also made aware of other rehab opportunities including  Post rehab located at [address] [place] [address], which also has a a 3/4 long-term treatment center for those who wish to work while in treatment. Patient states she was interested and is exploring all her options. She appears very motivated to her sobriety and looks forward to attending [facility].

## 2018-12-30 NOTE — ED BEHAVIORAL HEALTH ASSESSMENT NOTE - PRIMARY DX
Bipolar 1 disorder Deferred condition on axis II Alcohol dependence with unspecified alcohol-induced disorder

## 2018-12-30 NOTE — ED ADULT NURSE REASSESSMENT NOTE - NS ED NURSE REASSESS COMMENT FT1
patient agitated. states she would like to sign out AMA. Dr. Bolivar aware. patient unable to sign out AMA due to ETOH level. patient aware. patient states "I don't know what is keeping me from ripping out this IV and leaving". CO now in place. patient asking for something to help her sleep. upon going into room to medicate patient, patient requesting to speak to Dr. Bolivar prior to receiving medication. Dr. Bolivar made aware.

## 2018-12-30 NOTE — ED BEHAVIORAL HEALTH ASSESSMENT NOTE - CURRENT MEDICATION
seroquel 100 mg qhs  clonazepam 0.5mg qhs Seroquel 100 mg qhs  clonazepam 0.5mg qhs  Propranolol 20 mg PO BID

## 2018-12-30 NOTE — ED BEHAVIORAL HEALTH ASSESSMENT NOTE - OTHER PAST PSYCHIATRIC HISTORY (INCLUDE DETAILS REGARDING ONSET, COURSE OF ILLNESS, INPATIENT/OUTPATIENT TREATMENT)
hx of bipolar, hospitalized 2011, 2012 and 2013, voluntary hospitalizations for depression Per record, hx of bipolar, hospitalized 2011, 2012 and 2013, voluntary hospitalizations for depression. However, patient states it was just one prior voluntary admission, and denies history of involuntary admission.

## 2019-01-02 ENCOUNTER — APPOINTMENT (OUTPATIENT)
Dept: FAMILY MEDICINE | Facility: CLINIC | Age: 39
End: 2019-01-02
Payer: MEDICAID

## 2019-01-02 VITALS
SYSTOLIC BLOOD PRESSURE: 110 MMHG | HEART RATE: 110 BPM | DIASTOLIC BLOOD PRESSURE: 84 MMHG | HEIGHT: 67 IN | RESPIRATION RATE: 14 BRPM | OXYGEN SATURATION: 97 % | BODY MASS INDEX: 22.97 KG/M2 | WEIGHT: 146.31 LBS

## 2019-01-02 PROCEDURE — 99214 OFFICE O/P EST MOD 30 MIN: CPT

## 2019-01-17 ENCOUNTER — RX RENEWAL (OUTPATIENT)
Age: 39
End: 2019-01-17

## 2019-01-17 RX ORDER — TEMAZEPAM 22.5 MG/1
22.5 CAPSULE ORAL
Qty: 15 | Refills: 0 | Status: ACTIVE | COMMUNITY
Start: 2019-01-17 | End: 1900-01-01

## 2019-01-22 PROBLEM — F32.9 MAJOR DEPRESSIVE DISORDER, SINGLE EPISODE, UNSPECIFIED: Chronic | Status: ACTIVE | Noted: 2018-12-29

## 2019-01-30 ENCOUNTER — APPOINTMENT (OUTPATIENT)
Dept: FAMILY MEDICINE | Facility: CLINIC | Age: 39
End: 2019-01-30
Payer: MEDICAID

## 2019-01-30 VITALS
HEART RATE: 85 BPM | SYSTOLIC BLOOD PRESSURE: 112 MMHG | BODY MASS INDEX: 23.39 KG/M2 | HEIGHT: 67 IN | DIASTOLIC BLOOD PRESSURE: 76 MMHG | WEIGHT: 149 LBS | OXYGEN SATURATION: 99 %

## 2019-01-30 DIAGNOSIS — Z87.898 PERSONAL HISTORY OF OTHER SPECIFIED CONDITIONS: ICD-10-CM

## 2019-01-30 PROCEDURE — 99214 OFFICE O/P EST MOD 30 MIN: CPT

## 2019-01-30 RX ORDER — PROPRANOLOL HYDROCHLORIDE 20 MG/1
20 TABLET ORAL
Qty: 60 | Refills: 11 | Status: DISCONTINUED | COMMUNITY
Start: 2018-02-28 | End: 2019-01-30

## 2019-01-30 RX ORDER — BENZONATATE 100 MG/1
100 CAPSULE ORAL
Qty: 30 | Refills: 1 | Status: DISCONTINUED | COMMUNITY
Start: 2018-10-25 | End: 2019-01-30

## 2019-01-30 RX ORDER — HYDROXYZINE HYDROCHLORIDE 10 MG/1
10 TABLET ORAL
Qty: 30 | Refills: 0 | Status: DISCONTINUED | COMMUNITY
Start: 2019-01-02 | End: 2019-01-30

## 2019-01-30 NOTE — PHYSICAL EXAM
[No Acute Distress] : no acute distress [Well Nourished] : well nourished [Well Developed] : well developed [Well-Appearing] : well-appearing [Normal Voice/Communication] : normal voice/communication [Normal Sclera/Conjunctiva] : normal sclera/conjunctiva [Normal Outer Ear/Nose] : the outer ears and nose were normal in appearance [Clear to Auscultation] : lungs were clear to auscultation bilaterally [No Accessory Muscle Use] : no accessory muscle use [Normal Rate] : normal rate  [Regular Rhythm] : with a regular rhythm [Normal S1, S2] : normal S1 and S2 [de-identified] : sl erythema of o/p, tonsils slightly enlarged, no exudates

## 2019-01-30 NOTE — HISTORY OF PRESENT ILLNESS
[FreeTextEntry1] : Patient presents s/p hospital discharge. States living with her parents again, tried to get herself admitted to [facility] psych program (outpatient?), but they wouldn't take her because she didn't need it as per patient. Called her insurance, and has 3 upcoming appointments with counselors that are on her insurance to pick one she likes, and will f/u with them as often as needed, she is hoping to start with at least 3 times weekly. States she is no longer drinking, and still seeing the new boyfriend from [place] who comes to visit her regularly. Feeling better, but still anxious, wants to increase clonazepam. Also needs medicaitons for 1-2 months at least. The counselors also work with psychiatrists, she was told. Has contact wih her daughter, who is now still in [place] with the father. \par \par ROS: negative except as noted above\par

## 2019-02-12 NOTE — ED ADULT TRIAGE NOTE - BMI (KG/M2)
Information: Selecting Yes will display possible errors in your note based on the variables you have selected. This validation is only offered as a suggestion for you. PLEASE NOTE THAT THE VALIDATION TEXT WILL BE REMOVED WHEN YOU FINALIZE YOUR NOTE. IF YOU WANT TO FAX A PRELIMINARY NOTE YOU WILL NEED TO TOGGLE THIS TO 'NO' IF YOU DO NOT WANT IT IN YOUR FAXED NOTE. 20.4

## 2019-02-27 ENCOUNTER — RX RENEWAL (OUTPATIENT)
Age: 39
End: 2019-02-27

## 2019-03-08 DIAGNOSIS — R05 COUGH: ICD-10-CM

## 2019-03-08 RX ORDER — BENZONATATE 100 MG/1
100 CAPSULE ORAL
Qty: 30 | Refills: 1 | Status: ACTIVE | COMMUNITY
Start: 2019-03-08 | End: 1900-01-01

## 2019-03-13 ENCOUNTER — APPOINTMENT (OUTPATIENT)
Dept: FAMILY MEDICINE | Facility: CLINIC | Age: 39
End: 2019-03-13
Payer: MEDICAID

## 2019-03-13 VITALS
RESPIRATION RATE: 16 BRPM | HEART RATE: 91 BPM | TEMPERATURE: 98.4 F | DIASTOLIC BLOOD PRESSURE: 80 MMHG | OXYGEN SATURATION: 97 % | HEIGHT: 67 IN | SYSTOLIC BLOOD PRESSURE: 120 MMHG | WEIGHT: 154.19 LBS | BODY MASS INDEX: 24.2 KG/M2

## 2019-03-13 DIAGNOSIS — Z87.09 PERSONAL HISTORY OF OTHER DISEASES OF THE RESPIRATORY SYSTEM: ICD-10-CM

## 2019-03-13 PROCEDURE — 99214 OFFICE O/P EST MOD 30 MIN: CPT

## 2019-03-14 NOTE — PHYSICAL EXAM
[No Acute Distress] : no acute distress [Well Nourished] : well nourished [Well Developed] : well developed [Well-Appearing] : well-appearing [Normal Voice/Communication] : normal voice/communication [Normal Sclera/Conjunctiva] : normal sclera/conjunctiva [Normal Outer Ear/Nose] : the outer ears and nose were normal in appearance [Normal TMs] : both tympanic membranes were normal [No Respiratory Distress] : no respiratory distress  [Clear to Auscultation] : lungs were clear to auscultation bilaterally [No Accessory Muscle Use] : no accessory muscle use [Normal Rate] : normal rate  [Regular Rhythm] : with a regular rhythm [Normal S1, S2] : normal S1 and S2 [No Edema] : there was no peripheral edema [Normal Supraclavicular Nodes] : no supraclavicular lymphadenopathy [Normal Posterior Cervical Nodes] : no posterior cervical lymphadenopathy [Normal Anterior Cervical Nodes] : no anterior cervical lymphadenopathy [Normal Gait] : normal gait [Coordination Grossly Intact] : coordination grossly intact [Memory Grossly Normal] : memory grossly normal [Normal Affect] : the affect was normal [Alert and Oriented x3] : oriented to person, place, and time [Normal Insight/Judgement] : insight and judgment were intact [de-identified] : +erythema o/p, no tonsillar exudatea [de-identified] : bronchial breath sounds

## 2019-03-14 NOTE — HISTORY OF PRESENT ILLNESS
[FreeTextEntry8] : Patient presents c/o continued cough. [facility]. Dx with bronchitis. Went to [facility] recently and was prescribed: prednisone medrol dose earline, albuterol via nebs, flonase, doxycycline. \par \par  Patient states she had hallucinations from the meds. \par \par cough still persistent, Tessalon perles I sent not working. coughing all day and at night. \par stopped smoking past few days, using e-cigarette for past few days instead. Low energy. Tmax fever past few days: 100.3, +chills/sweats, no n/v. +diarrhea now improving, has been taking imodium.

## 2019-03-14 NOTE — ASSESSMENT
[FreeTextEntry1] : continue Flonase, also albuterol prn, patient also was prescribed Flovent, continue Flovent 2 puffs bid\par rest, increase fluids\par cough syrup prescribed prior to bed time\par Return to office if you feel worse or do not feel better\par

## 2019-04-03 ENCOUNTER — EMERGENCY (EMERGENCY)
Facility: HOSPITAL | Age: 39
LOS: 1 days | Discharge: HOME | End: 2019-04-03
Attending: STUDENT IN AN ORGANIZED HEALTH CARE EDUCATION/TRAINING PROGRAM
Payer: MEDICAID

## 2019-04-03 VITALS
DIASTOLIC BLOOD PRESSURE: 80 MMHG | HEART RATE: 82 BPM | SYSTOLIC BLOOD PRESSURE: 116 MMHG | OXYGEN SATURATION: 99 % | RESPIRATION RATE: 16 BRPM | TEMPERATURE: 98 F

## 2019-04-03 VITALS
HEIGHT: 67 IN | HEART RATE: 86 BPM | RESPIRATION RATE: 18 BRPM | SYSTOLIC BLOOD PRESSURE: 113 MMHG | DIASTOLIC BLOOD PRESSURE: 83 MMHG | TEMPERATURE: 99 F | WEIGHT: 145.06 LBS | OXYGEN SATURATION: 99 %

## 2019-04-03 DIAGNOSIS — M67.912 UNSPECIFIED DISORDER OF SYNOVIUM AND TENDON, LEFT SHOULDER: Chronic | ICD-10-CM

## 2019-04-03 LAB
ALBUMIN SERPL ELPH-MCNC: 4.7 G/DL — SIGNIFICANT CHANGE UP (ref 3.3–5.2)
ALP SERPL-CCNC: 59 U/L — SIGNIFICANT CHANGE UP (ref 40–120)
ALT FLD-CCNC: 22 U/L — SIGNIFICANT CHANGE UP
ANION GAP SERPL CALC-SCNC: 12 MMOL/L — SIGNIFICANT CHANGE UP (ref 5–17)
APPEARANCE UR: CLEAR — SIGNIFICANT CHANGE UP
AST SERPL-CCNC: 18 U/L — SIGNIFICANT CHANGE UP
BASOPHILS # BLD AUTO: 0.1 K/UL — SIGNIFICANT CHANGE UP (ref 0–0.2)
BASOPHILS NFR BLD AUTO: 0.9 % — SIGNIFICANT CHANGE UP (ref 0–2)
BILIRUB SERPL-MCNC: 0.7 MG/DL — SIGNIFICANT CHANGE UP (ref 0.4–2)
BILIRUB UR-MCNC: NEGATIVE — SIGNIFICANT CHANGE UP
BUN SERPL-MCNC: 13 MG/DL — SIGNIFICANT CHANGE UP (ref 8–20)
CALCIUM SERPL-MCNC: 9.3 MG/DL — SIGNIFICANT CHANGE UP (ref 8.6–10.2)
CHLORIDE SERPL-SCNC: 100 MMOL/L — SIGNIFICANT CHANGE UP (ref 98–107)
CO2 SERPL-SCNC: 25 MMOL/L — SIGNIFICANT CHANGE UP (ref 22–29)
COLOR SPEC: YELLOW — SIGNIFICANT CHANGE UP
CREAT SERPL-MCNC: 0.61 MG/DL — SIGNIFICANT CHANGE UP (ref 0.5–1.3)
DIFF PNL FLD: NEGATIVE — SIGNIFICANT CHANGE UP
EOSINOPHIL # BLD AUTO: 0.2 K/UL — SIGNIFICANT CHANGE UP (ref 0–0.5)
EOSINOPHIL NFR BLD AUTO: 2.4 % — SIGNIFICANT CHANGE UP (ref 0–6)
GLUCOSE SERPL-MCNC: 105 MG/DL — SIGNIFICANT CHANGE UP (ref 70–115)
GLUCOSE UR QL: NEGATIVE MG/DL — SIGNIFICANT CHANGE UP
HCG SERPL-ACNC: <4 MIU/ML — SIGNIFICANT CHANGE UP
HCT VFR BLD CALC: 42.2 % — SIGNIFICANT CHANGE UP (ref 37–47)
HGB BLD-MCNC: 14.3 G/DL — SIGNIFICANT CHANGE UP (ref 12–16)
KETONES UR-MCNC: NEGATIVE — SIGNIFICANT CHANGE UP
LEUKOCYTE ESTERASE UR-ACNC: NEGATIVE — SIGNIFICANT CHANGE UP
LIDOCAIN IGE QN: 74 U/L — HIGH (ref 22–51)
LYMPHOCYTES # BLD AUTO: 2.7 K/UL — SIGNIFICANT CHANGE UP (ref 1–4.8)
LYMPHOCYTES # BLD AUTO: 34.2 % — SIGNIFICANT CHANGE UP (ref 20–55)
MCHC RBC-ENTMCNC: 33.6 PG — HIGH (ref 27–31)
MCHC RBC-ENTMCNC: 33.9 G/DL — SIGNIFICANT CHANGE UP (ref 32–36)
MCV RBC AUTO: 99.3 FL — HIGH (ref 81–99)
MONOCYTES # BLD AUTO: 0.6 K/UL — SIGNIFICANT CHANGE UP (ref 0–0.8)
MONOCYTES NFR BLD AUTO: 8.1 % — SIGNIFICANT CHANGE UP (ref 3–10)
NEUTROPHILS # BLD AUTO: 4.3 K/UL — SIGNIFICANT CHANGE UP (ref 1.8–8)
NEUTROPHILS NFR BLD AUTO: 53.9 % — SIGNIFICANT CHANGE UP (ref 37–73)
NITRITE UR-MCNC: NEGATIVE — SIGNIFICANT CHANGE UP
PH UR: 6 — SIGNIFICANT CHANGE UP (ref 5–8)
PLATELET # BLD AUTO: 292 K/UL — SIGNIFICANT CHANGE UP (ref 150–400)
POTASSIUM SERPL-MCNC: 4.7 MMOL/L — SIGNIFICANT CHANGE UP (ref 3.5–5.3)
POTASSIUM SERPL-SCNC: 4.7 MMOL/L — SIGNIFICANT CHANGE UP (ref 3.5–5.3)
PROT SERPL-MCNC: 8 G/DL — SIGNIFICANT CHANGE UP (ref 6.6–8.7)
PROT UR-MCNC: 15 MG/DL
RBC # BLD: 4.25 M/UL — LOW (ref 4.4–5.2)
RBC # FLD: 12.9 % — SIGNIFICANT CHANGE UP (ref 11–15.6)
SODIUM SERPL-SCNC: 137 MMOL/L — SIGNIFICANT CHANGE UP (ref 135–145)
SP GR SPEC: 1.01 — SIGNIFICANT CHANGE UP (ref 1.01–1.02)
UROBILINOGEN FLD QL: NEGATIVE MG/DL — SIGNIFICANT CHANGE UP
WBC # BLD: 8 K/UL — SIGNIFICANT CHANGE UP (ref 4.8–10.8)
WBC # FLD AUTO: 8 K/UL — SIGNIFICANT CHANGE UP (ref 4.8–10.8)

## 2019-04-03 PROCEDURE — 74177 CT ABD & PELVIS W/CONTRAST: CPT | Mod: 26

## 2019-04-03 PROCEDURE — 84702 CHORIONIC GONADOTROPIN TEST: CPT

## 2019-04-03 PROCEDURE — 74177 CT ABD & PELVIS W/CONTRAST: CPT

## 2019-04-03 PROCEDURE — 36415 COLL VENOUS BLD VENIPUNCTURE: CPT

## 2019-04-03 PROCEDURE — 96372 THER/PROPH/DIAG INJ SC/IM: CPT | Mod: XU

## 2019-04-03 PROCEDURE — 80053 COMPREHEN METABOLIC PANEL: CPT

## 2019-04-03 PROCEDURE — 99284 EMERGENCY DEPT VISIT MOD MDM: CPT | Mod: 25

## 2019-04-03 PROCEDURE — 85027 COMPLETE CBC AUTOMATED: CPT

## 2019-04-03 PROCEDURE — 81001 URINALYSIS AUTO W/SCOPE: CPT

## 2019-04-03 PROCEDURE — 99284 EMERGENCY DEPT VISIT MOD MDM: CPT

## 2019-04-03 PROCEDURE — 83690 ASSAY OF LIPASE: CPT

## 2019-04-03 PROCEDURE — 96374 THER/PROPH/DIAG INJ IV PUSH: CPT | Mod: XU

## 2019-04-03 RX ORDER — SODIUM CHLORIDE 9 MG/ML
3 INJECTION INTRAMUSCULAR; INTRAVENOUS; SUBCUTANEOUS ONCE
Qty: 0 | Refills: 0 | Status: COMPLETED | OUTPATIENT
Start: 2019-04-03 | End: 2019-04-03

## 2019-04-03 RX ORDER — KETOROLAC TROMETHAMINE 30 MG/ML
30 SYRINGE (ML) INJECTION ONCE
Qty: 0 | Refills: 0 | Status: DISCONTINUED | OUTPATIENT
Start: 2019-04-03 | End: 2019-04-03

## 2019-04-03 RX ORDER — SODIUM CHLORIDE 9 MG/ML
1000 INJECTION INTRAMUSCULAR; INTRAVENOUS; SUBCUTANEOUS ONCE
Qty: 0 | Refills: 0 | Status: COMPLETED | OUTPATIENT
Start: 2019-04-03 | End: 2019-04-03

## 2019-04-03 RX ADMIN — SODIUM CHLORIDE 3 MILLILITER(S): 9 INJECTION INTRAMUSCULAR; INTRAVENOUS; SUBCUTANEOUS at 17:17

## 2019-04-03 RX ADMIN — Medication 30 MILLIGRAM(S): at 19:33

## 2019-04-03 RX ADMIN — SODIUM CHLORIDE 1000 MILLILITER(S): 9 INJECTION INTRAMUSCULAR; INTRAVENOUS; SUBCUTANEOUS at 17:16

## 2019-04-03 RX ADMIN — Medication 20 MILLIGRAM(S): at 17:17

## 2019-04-03 NOTE — ED STATDOCS - CLINICAL SUMMARY MEDICAL DECISION MAKING FREE TEXT BOX
Pt with non descript diffuse abd pain, intermittent for two weeks with intermittent diarrhea.l Give fluids, meds, check labs, CT scan, and reassess.

## 2019-04-03 NOTE — ED STATDOCS - PROGRESS NOTE DETAILS
Results noted and findings d/w patient and parents. Encourage bland diet and GI f/u Pt reeval- reports intermittent colicky pain and diarrhea x 2 weeks after taking augmentin and prednisone for bronchitis. Abd soft- no tenderness appreciated. Results noted and findings d/w patient and parents. Encourage bland diet and GI f/u for further w/u

## 2019-04-03 NOTE — ED STATDOCS - OBJECTIVE STATEMENT
Pt is a 38 y/o F presenting to the ED with c/o abdominal pain, onset two weeks ago. Pt states the pain has been constant but progressively worsening, described as something is pushing on her organs, and radiates to her lateral ribs and back. She notes the sxs began after starting augmentin. Returned to the urgent care as she was having significant GI upset, had negative C diff testing, but is still having persistent pain that was worse today and yesterday. Denies nausea, vomiting, CP, SOB, and urinary sxs.  No sick contacts or travel. Pt is a 40 y/o F presenting to the ED with c/o abdominal pain, onset two weeks ago. Pt states the pain has been constant but progressively worsening, described as something is pushing on her organs, and radiates to her lateral ribs and back. She notes the sxs began after starting augmentin. Returned to the urgent care as she was having significant GI upset, had negative C diff testing, but is still having persistent pain that was worse today and yesterday. Denies nausea, vomiting, CP, SOB, and urinary sxs.  No sick contacts or travel. Denies chance of pregnancy, LMP 3/12/2019.

## 2019-04-03 NOTE — ED STATDOCS - NS ED ROS FT
No fever/chills, No photophobia/eye pain/changes in vision, No ear pain/sore throat/dysphagia, No chest pain/palpitations, no SOB/cough/wheeze/stridor, No No N/V/D, no dysuria/frequency/discharge, No neck/back pain, no rash, no changes in neurological status/function.     + abd pain, diarrhea

## 2019-04-03 NOTE — ED STATDOCS - PHYSICAL EXAMINATION
Constitutional - well-developed; well nourished. Head - NCAT. Airway patent. Eyes - PERRL. CV - RRR. no murmur. no edema. Pulm - CTAB. Abd - soft, mild diffuse abd tenderness. no rebound. no guarding. Neuro - A&Ox3. strength 5/5 x4. sensation intact x4. normal gait. Skin - No rash. MSK - normal ROM.

## 2019-04-03 NOTE — SBIRT NOTE. - NSSBIRTSERVICES_GEN_A_ED_FT
Provided SBIRT services: Full screen positive. Brief Intervention Performed. Screening results were reviewed with the patient and patient was provided information about healthy guidelines and potential negative consequences associated with level of risk. Motivation and readiness to reduce or stop use was discussed and goals and activities to make changes were suggested/offered.  Audit Score: 15  DAST Score: 0  Duration = 15 Minutes

## 2019-04-03 NOTE — ED ADULT NURSE NOTE - NSIMPLEMENTINTERV_GEN_ALL_ED
Implemented All Universal Safety Interventions:  [place] to call system. Call bell, personal items and telephone within reach. Instruct patient to call for assistance. Room bathroom lighting operational. Non-slip footwear when patient is off stretcher. Physically safe environment: no spills, clutter or unnecessary equipment. Stretcher in lowest position, wheels locked, appropriate side rails in place.

## 2019-04-03 NOTE — ED STATDOCS - PMH
Bipolar disorder  ~ s/p 4 voluntary admissions  Bipolar disorder    Depression, unspecified depression type

## 2019-04-03 NOTE — ED ADULT NURSE NOTE - OBJECTIVE STATEMENT
Pt c/o stomach discomfort for about a week, states she has been sick with bronchitis for about a month. Her abdominal pain was very severe last night radiating from right abdomen to back and ribs.

## 2019-04-03 NOTE — ED STATDOCS - ATTENDING CONTRIBUTION TO CARE
I performed a face to face history and physical exam of the patient and discussed their management with the resident/ACP. I reviewed the resident/ACP's note and agree with the documented findings and plan of care.    labs and imaging reviewed.  no cause of pain found. Pt reassured and instructed to f/up with pcp/gi as outpatient.

## 2019-04-03 NOTE — ED ADULT TRIAGE NOTE - CHIEF COMPLAINT QUOTE
Pt ambulatory in ED c/o abdominal cramping, reports she went to urgent care and was checked for c-diff which was negative. Pt reports she was placed on augmentin x2 weeks ago for bronchitis.

## 2019-04-05 ENCOUNTER — APPOINTMENT (OUTPATIENT)
Dept: GASTROENTEROLOGY | Facility: CLINIC | Age: 39
End: 2019-04-05
Payer: MEDICAID

## 2019-04-05 ENCOUNTER — EMERGENCY (EMERGENCY)
Facility: HOSPITAL | Age: 39
LOS: 1 days | Discharge: LEFT BEFORE DISCHARGE | End: 2019-04-05

## 2019-04-05 VITALS
RESPIRATION RATE: 20 BRPM | OXYGEN SATURATION: 100 % | WEIGHT: 134.92 LBS | SYSTOLIC BLOOD PRESSURE: 118 MMHG | HEIGHT: 67 IN | DIASTOLIC BLOOD PRESSURE: 83 MMHG | HEART RATE: 100 BPM | TEMPERATURE: 98 F

## 2019-04-05 VITALS
SYSTOLIC BLOOD PRESSURE: 106 MMHG | HEIGHT: 67 IN | RESPIRATION RATE: 15 BRPM | WEIGHT: 154 LBS | DIASTOLIC BLOOD PRESSURE: 82 MMHG | HEART RATE: 99 BPM | OXYGEN SATURATION: 98 % | BODY MASS INDEX: 24.17 KG/M2

## 2019-04-05 DIAGNOSIS — Z86.59 PERSONAL HISTORY OF OTHER MENTAL AND BEHAVIORAL DISORDERS: ICD-10-CM

## 2019-04-05 DIAGNOSIS — R10.9 UNSPECIFIED ABDOMINAL PAIN: ICD-10-CM

## 2019-04-05 DIAGNOSIS — R19.7 DIARRHEA, UNSPECIFIED: ICD-10-CM

## 2019-04-05 DIAGNOSIS — F17.200 NICOTINE DEPENDENCE, UNSPECIFIED, UNCOMPLICATED: ICD-10-CM

## 2019-04-05 DIAGNOSIS — M67.912 UNSPECIFIED DISORDER OF SYNOVIUM AND TENDON, LEFT SHOULDER: Chronic | ICD-10-CM

## 2019-04-05 DIAGNOSIS — Z98.891 HISTORY OF UTERINE SCAR FROM PREVIOUS SURGERY: Chronic | ICD-10-CM

## 2019-04-05 DIAGNOSIS — Z78.9 OTHER SPECIFIED HEALTH STATUS: ICD-10-CM

## 2019-04-05 PROCEDURE — 82272 OCCULT BLD FECES 1-3 TESTS: CPT

## 2019-04-05 PROCEDURE — 99204 OFFICE O/P NEW MOD 45 MIN: CPT

## 2019-04-05 RX ORDER — BACILLUS COAGULANS/INULIN 1B-250 MG
CAPSULE ORAL
Refills: 0 | Status: ACTIVE | COMMUNITY

## 2019-04-05 RX ORDER — GUAIFENESIN AND CODEINE PHOSPHATE 10; 100 MG/5ML; MG/5ML
100-10 SOLUTION ORAL
Qty: 1 | Refills: 0 | Status: DISCONTINUED | COMMUNITY
Start: 2019-03-13 | End: 2019-04-05

## 2019-04-05 RX ORDER — DICYCLOMINE HYDROCHLORIDE 10 MG/1
10 CAPSULE ORAL
Refills: 0 | Status: ACTIVE | COMMUNITY

## 2019-04-05 NOTE — HISTORY OF PRESENT ILLNESS
[de-identified] : Weeks ago she suddenly developed nausea vomiting and diarrhea. The nausea and vomiting quickly resolved but she has continued to have diarrhea ever since that time which consists of 4-6 loose watery daily stools. 2 weeks ago she was given Augmentin for an upper lesser curve tract infection but discontinued the medication after several days. At about that same time she began to experience recurrent lower abdominal cramping which has now localized to the right abdomen and is intermittent throughout the day. As a result of these symptoms she has been unable to work. There are no particular inciting or alleviating factors. She was given dicyclomine which has been ineffective and was given and a very low dose. According to the patient a stool specimen was submitted one week ago for C. difficile toxin which was negative. 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 There was a right-sided corpus luteum. There was a small fat-containing umbilical hernia. There were no bowel abnormalities. On that same day a CBC, electrolytes, BUN, creatinine, calcium and liver function tests were normal. Urinalysis was unremarkable. A lipase was normal. There is no rectal bleeding or melena. There is no recent travel history.

## 2019-04-05 NOTE — PHYSICAL EXAM
[General Appearance - Alert] : alert [General Appearance - In No Acute Distress] : in no acute distress [General Appearance - Well Nourished] : well nourished [General Appearance - Well Developed] : well developed [General Appearance - Well-Appearing] : healthy appearing [Sclera] : the sclera and conjunctiva were normal [PERRL With Normal Accommodation] : pupils were equal in size, round, and reactive to light [Extraocular Movements] : extraocular movements were intact [Outer Ear] : the ears and nose were normal in appearance [Hearing Threshold Finger Rub Not [place]] : hearing was normal [Examination Of The Oral Cavity] : the lips and gums were normal [Oropharynx] : the oropharynx was normal [Neck Appearance] : the appearance of the neck was normal [Auscultation Breath Sounds / Voice Sounds] : lungs were clear to auscultation bilaterally [Heart Rate And Rhythm] : heart rate was normal and rhythm regular [Heart Sounds] : normal S1 and S2 [Heart Sounds Gallop] : no gallops [Murmurs] : no murmurs [Heart Sounds Pericardial Friction Rub] : no pericardial rub [Bowel Sounds] : normal bowel sounds [Abdomen Soft] : soft [Abdomen Mass (___ Cm)] : no abdominal mass palpated [No CVA Tenderness] : no ~M costovertebral angle tenderness [No Spinal Tenderness] : no spinal tenderness [Abnormal Walk] : normal gait [Nail Clubbing] : no clubbing  or cyanosis of the fingernails [Musculoskeletal - Swelling] : no joint swelling seen [Motor Tone] : muscle strength and tone were normal [Skin Color & Pigmentation] : normal skin color and pigmentation [Skin Turgor] : normal skin turgor [] : no rash [Motor Exam] : the motor exam was normal [No Focal Deficits] : no focal deficits [Oriented To Time, Place, And Person] : oriented to person, place, and time [Impaired Insight] : insight and judgment were intact [Affect] : the affect was normal [RUQ] : in the right upper quadrant [RLQ] : in the right lower quadrant [Normal Sphincter Tone] : normal sphincter tone [No Rectal Mass] : no rectal mass [Occult Blood Positive] : stool was negative for occult blood [FreeTextEntry1] : There was scant loose stool in the rectal vault. Jesus rodriguez
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
## 2019-04-05 NOTE — ASSESSMENT
[FreeTextEntry1] : In all probability she has some type of viral gastroenteritis enterocolitis which may have involved into a post infectious irritable bowel syndrome. I advised a low-residue diet and that she avoid dairy. She does also take Tylenol as needed for the pain. She will submit further stool studies for parasites, culture and sensitivity and GI PCR. She will also obtain thyroid function tests as well as celiac antibodies. Further recommendations will depend upon her social course and the results of the above.

## 2019-04-06 NOTE — ED ADULT NURSE REASSESSMENT NOTE - NS ED NURSE REASSESS COMMENT FT1
Unable to assess pt due to pt leaving before being seen by RN/MD. Pt removed own c collar. Pt came up to nurses station and states she has called her own [employer] with personal cell phone. Pt ambulating without difficulty, no gait disturbances noted. Pt left ER safely at this time.

## 2019-04-06 NOTE — ED ADULT NURSE NOTE - EXPLANATION OF PATIENT'S REASON FOR LEAVING
Came to nurses station after removing own c collar, ambulating without difficulty, no gait disturbance, no obvious injury noted, pt does not wish to be seen at this time. Pt called own uber. Pt left at this time.

## 2019-04-08 ENCOUNTER — APPOINTMENT (OUTPATIENT)
Dept: FAMILY MEDICINE | Facility: CLINIC | Age: 39
End: 2019-04-08

## 2019-04-09 LAB
CRP SERPL-MCNC: <0.1 MG/DL
ENDOMYSIUM IGA SER QL: NEGATIVE
ENDOMYSIUM IGA TITR SER: NORMAL
IGA SER QL IEP: 219 MG/DL
T3FREE SERPL-MCNC: 2.64 PG/ML
T4 FREE SERPL-MCNC: 1.1 NG/DL
TSH SERPL-ACNC: 0.91 UIU/ML

## 2019-04-11 LAB
GLIADIN IGA SER QL: 5.3 UNITS
GLIADIN IGG SER QL: <5 UNITS
GLIADIN PEPTIDE IGA SER-ACNC: NEGATIVE
GLIADIN PEPTIDE IGG SER-ACNC: NEGATIVE

## 2019-04-12 LAB
TTG IGA SER IA-ACNC: <1.2 U/ML
TTG IGA SER-ACNC: NEGATIVE
TTG IGG SER IA-ACNC: <1.2 U/ML
TTG IGG SER IA-ACNC: NEGATIVE

## 2019-04-29 ENCOUNTER — APPOINTMENT (OUTPATIENT)
Dept: FAMILY MEDICINE | Facility: CLINIC | Age: 39
End: 2019-04-29
Payer: MEDICAID

## 2019-04-29 VITALS
HEIGHT: 68 IN | SYSTOLIC BLOOD PRESSURE: 100 MMHG | BODY MASS INDEX: 22.94 KG/M2 | RESPIRATION RATE: 14 BRPM | DIASTOLIC BLOOD PRESSURE: 82 MMHG | WEIGHT: 151.38 LBS | HEART RATE: 83 BPM | OXYGEN SATURATION: 97 %

## 2019-04-29 DIAGNOSIS — G47.00 INSOMNIA, UNSPECIFIED: ICD-10-CM

## 2019-04-29 DIAGNOSIS — F31.9 BIPOLAR DISORDER, UNSPECIFIED: ICD-10-CM

## 2019-04-29 DIAGNOSIS — F41.9 ANXIETY DISORDER, UNSPECIFIED: ICD-10-CM

## 2019-04-29 PROCEDURE — 99214 OFFICE O/P EST MOD 30 MIN: CPT

## 2019-04-29 RX ORDER — HYDROXYZINE HYDROCHLORIDE 25 MG/1
25 TABLET ORAL TWICE DAILY
Qty: 60 | Refills: 11 | Status: ACTIVE | COMMUNITY
Start: 2019-01-30 | End: 1900-01-01

## 2019-04-29 RX ORDER — QUETIAPINE FUMARATE 200 MG/1
200 TABLET ORAL DAILY
Qty: 30 | Refills: 1 | Status: ACTIVE | COMMUNITY
Start: 2019-04-29 | End: 1900-01-01

## 2019-04-29 RX ORDER — PROPRANOLOL HYDROCHLORIDE 40 MG/1
40 TABLET ORAL
Qty: 60 | Refills: 11 | Status: ACTIVE | COMMUNITY
Start: 2019-01-30 | End: 1900-01-01

## 2019-04-30 PROBLEM — F31.9 BIPOLAR DISORDER: Status: ACTIVE | Noted: 2018-02-28

## 2019-04-30 PROBLEM — F41.9 ANXIETY: Status: ACTIVE | Noted: 2018-02-28

## 2019-04-30 PROBLEM — G47.00 INSOMNIA: Status: ACTIVE | Noted: 2019-01-02

## 2019-04-30 RX ORDER — QUETIAPINE FUMARATE 100 MG/1
100 TABLET ORAL
Qty: 30 | Refills: 1 | Status: DISCONTINUED | COMMUNITY
Start: 2018-10-11 | End: 2019-04-30

## 2019-04-30 RX ORDER — CLONAZEPAM 1 MG/1
1 TABLET ORAL
Qty: 60 | Refills: 0 | Status: DISCONTINUED | COMMUNITY
Start: 2018-12-12 | End: 2019-04-30

## 2019-04-30 NOTE — PHYSICAL EXAM
[No Acute Distress] : no acute distress [Well Nourished] : well nourished [Well Developed] : well developed [Well-Appearing] : well-appearing [Normal Voice/Communication] : normal voice/communication [Normal Sclera/Conjunctiva] : normal sclera/conjunctiva [Normal Outer Ear/Nose] : the outer ears and nose were normal in appearance [Normal Oropharynx] : the oropharynx was normal [No Respiratory Distress] : no respiratory distress  [Clear to Auscultation] : lungs were clear to auscultation bilaterally [No Accessory Muscle Use] : no accessory muscle use [Normal Rate] : normal rate  [Regular Rhythm] : with a regular rhythm [Normal S1, S2] : normal S1 and S2 [Speech Grossly Normal] : speech grossly normal [Memory Grossly Normal] : memory grossly normal [Normal Affect] : the affect was normal [Alert and Oriented x3] : oriented to person, place, and time [Normal Insight/Judgement] : insight and judgment were intact

## 2019-04-30 NOTE — ASSESSMENT
[FreeTextEntry1] : continue twice weekly counseling\par will increase clonazepam to 2 mg bid for now\par will increase Seroquel to 200 mg qhs for now\par f/u in 1 month
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
## 2019-05-09 NOTE — ED ADULT NURSE NOTE - NS ED NOTE ABUSE SUSPICION NEGLECT YN
Patient reports tenderness of the plantar aspect of the right foot, she feels her ankles are weaker, she doesn't feel she had any trauma, she cannot explain the issue.  She did find the diclofenac helpful.  She still has diffuse aching at the bottom of her foot and some radiation into the medial aspect.  No weakness.   No

## 2019-05-20 ENCOUNTER — APPOINTMENT (OUTPATIENT)
Dept: FAMILY MEDICINE | Facility: CLINIC | Age: 39
End: 2019-05-20

## 2019-05-28 RX ORDER — CLONAZEPAM 1 MG/1
1 TABLET ORAL
Qty: 60 | Refills: 0 | Status: ACTIVE | COMMUNITY
Start: 2019-05-28 | End: 1900-01-01

## 2019-05-28 RX ORDER — CLONAZEPAM 2 MG/1
2 TABLET ORAL
Qty: 60 | Refills: 0 | Status: DISCONTINUED | COMMUNITY
Start: 2019-04-29 | End: 2019-05-28

## 2020-01-24 NOTE — PATIENT PROFILE ADULT - OVER THE PAST TWO WEEKS HAVE YOU FELT DOWN, DEPRESSED OR HOPELESS?
Pending Prescriptions:                       Disp   Refills    amitriptyline (ELAVIL) 10 MG tablet        30 tab*1        Sig: Take 1 tablet (10 mg) by mouth At Bedtime    BP Readings from Last 3 Encounters:   09/11/18 120/62   02/07/18 122/72   11/29/17 100/60       Routing refill request to provider for review/approval because:  Labs not current:  BP    Hailee Pappas, MSN, RN      
yes

## 2020-03-10 NOTE — H&P ADULT - NSHPREVIEWOFSYSTEMS_GEN_ALL_CORE
REVIEW OF SYSTEMS:    CONSTITUTIONAL: No weakness, +fevers/chills  EYES/ENT: Blurry vision for the past week;  No vertigo or throat pain   NECK: No pain or stiffness  RESPIRATORY: No cough, wheezing, hemoptysis; No shortness of breath  CARDIOVASCULAR: No chest pain. Intermittent palpitations which she attributes to her anxiety.  GASTROINTESTINAL: No abdominal or epigastric pain. No nausea nor hematemesis. No melena or hematochezia. She reports one episode of self-induced vomiting last night. She reports GI symptoms related to recent change in diet.   GENITOURINARY: No dysuria, frequency or hematuria  NEUROLOGICAL: No numbness or weakness  SKIN: No itching, burning, rashes, or lesions   PSYCH: +auditory/visual hallucinations; No SI/HI; Endorses depression in the setting of recent breakup.   All other review of systems is negative unless indicated above. Detail Level: Detailed X Size Of Lesion In Cm (Optional): 0.3 Incorporate Mauc In Note: Yes

## 2020-08-12 NOTE — ED ADULT TRIAGE NOTE - RESPIRATORY RATE (BREATHS/MIN)
20 Cellcept Pregnancy And Lactation Text: This medication is Pregnancy Category D and isn't considered safe during pregnancy. It is unknown if this medication is excreted in breast milk.

## 2020-11-15 NOTE — ED BEHAVIORAL HEALTH ASSESSMENT NOTE - WITHDRAWAL SEIZURES / DTS
DAILY PROGRESS NOTE      Subjective   Pain in the right lower extremity with osteomyelitis of the great toe.    Objective   She is in bed, seem to be comfortable.  She was evaluated by podiatry earlier today.  She is refusing dressing changes or any intervention by present resident.  Not willing to have any surgery done.  States that her foot is doing better and she is not in as much pain as she was on admission.                                              I/O's    Intake/Output Summary (Last 24 hours) at 11/15/2020 1706  Last data filed at 11/15/2020 1300  Gross per 24 hour   Intake 1530.6 ml   Output 2550 ml   Net -1019.4 ml       Last Recorded Vitals  Visit Vitals  BP (!) 153/74   Pulse 71   Temp 97.7 °F (36.5 °C) (Oral)   Resp 19   Wt 63.5 kg (140 lb)   SpO2 90%   BMI 26.45 kg/m²         PE:   General: The patient is alert and oriented ×3, in no acute distress.  She is well-nourished and appears euvolemic.     Head:  Normocephalic  Eyes: Pupils are reactive and bilaterally symmetric.  No scleral icterus was seen.  EOMI     Mouth:  Oral mucosa is moist.  There were no oral ulcers or pharyngeal exudates seen.  Neck: Supple.  No increased jugular venous distention.    Cardiac: Heart is regular rate and rhythm without murmur.  Lungs: Lungs are clear to auscultation bilaterally.  Abdomen: Soft, nontender, bowel sounds are normoactive.  No masses or organomegaly was observed.  Musculoskeletal: Both feet are covered with clean dressings.  Right foot is elevated on a pillow, her toes are visible.  They are actually warm to touch but there is necrotic scars at the area where patient had ischemic damage.  Those include first second and third toe.  The bottom of her foot is bluish and kind of cold to touch.  She denies pain when her foot is examined.  Neuro: Brief neurologic exam assessing strength, coordination, and sensation is grossly intact    Labs:  Recent Labs   Lab 11/15/20  0655   WBC 7.1   RBC 3.06*   HGB 8.7*    HCT 26.3*        Recent Labs   Lab 11/14/20  1705 11/14/20  0339 11/13/20  0349 11/12/20  0615   SODIUM  --  135 134* 135   POTASSIUM 3.8 3.0* 3.4 3.4   CHLORIDE  --  100 101 101   CO2  --  29 30 28   BUN  --  11 14 14   CREATININE  --  0.73 0.87 0.78   GLUCOSE  --  103* 99 95   CALCIUM  --  8.2* 8.7 8.7     No results for input(s): INR in the last 72 hours.      Imaging  LAST ECHO/ECHO STRESS:  No procedure found.    LAST MRI:  11/10/20   MRI FOOT RIGHT W WO CONTRAST  Narrative  PROCEDURE INFORMATION: Exam: MR Right Lower Extremity Without and With Contrast; Forefoot Exam date and time: 11/11/2020 5:17 PM Age: 84 years old Clinical indication: Essential (primary) hypertension; Peripheral vascular disease, unspecified; Cellulitis of other sites; Other acute osteomyelitis, right ankle and foot; Location not specified; Additional info: Osteo TECHNIQUE: Imaging protocol: MR of the Right foot without and with intravenous contrast. Exam focused on the forefoot. Contrast material: GADAVIST; Contrast volume: 6.3 ml; Contrast route: INTRAVENOUS (IV);  COMPARISON: DX XR TOE 1ST RT (GREAT) MIN 2V 3/16/2020 12:08 PM FINDINGS: Bones and cartilage: Susceptibility artifact arising from fixation hardware at the 1st metatarsophalangeal joint degrades image quality. Assessment of the marrow signal within the 1st digit phalanges is not optimal due to the hardware artifact. Despite this, there appears to be abnormal marrow edema within the distal aspect of the 1st distal phalanx manifested as increased STIR signal and subtle decreased T1 marrow signal. Assessment for abnormal enhancement is limited due to the hardware artifact. Nonspecific cyst is seen within the calcaneus. No evidence of fracture or osteonecrosis. No other focal osseous lesion seen. Joint spaces: Unremarkable. No joint effusion. LIGAMENTS: Collateral ligaments of digits: Unremarkable. No evidence of tear. TENDONS: Flexor tendons of foot: Unremarkable. No  evidence of tear. Extensor tendons of foot: Unremarkable. No evidence of tear. Muscles: Atrophy of the intrinsic foot muscles consistent with peripheral neuropathy. Soft tissues: Nonspecific soft tissue edema within the foot. Allowing for the limitations of the study, no discrete soft tissue mass or fluid collection seen.   Impression  1. Suboptimal evaluation due artifact arising from hardware at the 1st metatarsophalangeal joint. 2. Abnormal marrow signal is seen within the distal half of the 1st distal phalanx, not optimally assessed on this study due to the imaging artifact. Findings are consistent with osteomyelitis of the 1st distal phalanx. 3. Nonspecific soft tissue edema. Allowing for limitations of this exam, no discrete soft tissue mass or fluid collection seen. Electronically Signed by: DAO AMATO M.D.Signed on: 11/11/2020 11:38 PM      LAST X-RAY:  11/10/20   XR FOOT MIN 3 VIEWS RIGHT  Narrative  EXAM: XR FOOT MIN 3 VIEWS RIGHTPROVIDED CLINICAL INFORMATION: right foot pain.TECHNIQUE:  XR FOOT MIN 3 VIEWS RIGHTCOMPARISON: Great toe March 16, 2020 reports no radiographic evidence of osteomyelitis great toe.  Status post arthrodesis of the right 1st metatarsal-phalangeal joint  Impression  FINDINGS/ IMPRESSION:1.   FAINT CALCIFICATION OF THE INTERDIGITAL ARTERIES AND POSTERIOR AND ANTERIOR TIBIAL ARTERY2.   SOFT TISSUE IRREGULARITY AND/OR DRESSING OVER THE DISTAL GREAT TOE3.   WHEN COMPARED TO THE PRIOR EXAMINATION, WITHIN THE DISTAL TUFT GREAT TOE A SMALL 2 MM CORTICAL DEFECT WHICH WAS NOT PRESENT ON THE PRIOR EXAMINATION 4.   MAY REPRESENT EARLY BONE DESTRUCTION AS CAN BE SEEN IN THE SETTING OF OSTEOMYELITIS AT THE DISTAL TUFT5.   LOSS OF CORTICAL DEFINITION IN THIS REGION6.   THERE IS NO PERIOSTEAL REACTION7.   NO NAE GAS IDENTIFIED IN THE SOFT TISSUES8.   REDEMONSTRATION OF A SCREW AND PLATE DEVICE FUSING THE 1ST METATARSAL-PHALANGEAL JOINT9.   REMAINDER OF THE TOES ARE [id number].   SOFT TISSUE  SWELLING DORSALLY AS SEEN ON THE LATERAL VIEW11.   TARSOMETATARSAL ARTICULATION IS [id number].   NEGATIVE FOR ACUTE FRACTURE OR [id number].   PLANTAR CALCANEAL SPURElectronically Signed by: DIANNA SANDRA MD Signed on: 11/10/2020 4:35 PM     Assessment & Plan   1.  Osteomyelitis of the great toe.  2.  Peripheral vascular disease  3.  Generalized atherosclerosis  4.  Cellulitis of the right foot  5.  Hypercholesterolemia  6.  Hypertension    I had a communication from the podiatry stating the patient refuses any treatment and they will sign out at this juncture.  Patient currently is on IV vancomycin by infectious disease.  She is hesitant to proceed with any sort of intervention.  She states that she had peripheral vascular disease since 1998 and has been managing it okay.  I spoke with her daughter on the phone in details as well.  Explained to daughter that patient made the decision on conservative management right now.  Risks of worsening among others discussed with patient and daughter.  I will have to consult with cardiology and infectious disease regarding appropriate outpatient management if patient does not change her mind.    DVT Prophylaxis   patient is on heparin infusion right now.      Pepe Cain MD             None known

## 2020-11-30 NOTE — ED PROVIDER NOTE - NSTIMEPROVIDERCAREINITIATE_GEN_ER
Detail Level: Detailed Quality 226: Preventive Care And Screening: Tobacco Use: Screening And Cessation Intervention: Patient screened for tobacco use and is an ex/non-smoker Quality 130: Documentation Of Current Medications In The Medical Record: Current Medications Documented Quality 431: Preventive Care And Screening: Unhealthy Alcohol Use - Screening: Patient screened for unhealthy alcohol use using a single question and scores less than 2 times per year 20-Oct-2018 18:34

## 2020-12-21 PROBLEM — Z87.09 HISTORY OF ACUTE BRONCHITIS: Status: RESOLVED | Noted: 2018-10-25 | Resolved: 2020-12-21

## 2021-01-08 NOTE — ED BEHAVIORAL HEALTH ASSESSMENT NOTE - MEDICATIONS (PRESCRIPTIONS, DIRECTIONS)
Next appt 1-19-20   Continue current meds. Continue current meds; detox protocol per [facility] provider.

## 2022-04-26 NOTE — ED BEHAVIORAL HEALTH ASSESSMENT NOTE - DOMICILE TYPE
Quality 431: Preventive Care And Screening: Unhealthy Alcohol Use - Screening: Patient not identified as an unhealthy alcohol user when screened for unhealthy alcohol use using a systematic screening method Detail Level: Detailed Quality 110: Preventive Care And Screening: Influenza Immunization: Influenza immunization was not ordered or administered, reason not given Quality 226: Preventive Care And Screening: Tobacco Use: Screening And Cessation Intervention: Patient screened for tobacco use and is an ex/non-smoker Private Residence Quality 130: Documentation Of Current Medications In The Medical Record: Current Medications Documented

## 2023-01-07 NOTE — ED ADULT NURSE NOTE - NSSISCREENINGQ5_ED_A_ED
Implemented All Universal Safety Interventions:  [place] to call system. Call bell, personal items and telephone within reach. Instruct patient to call for assistance. Room bathroom lighting operational. Non-slip footwear when patient is off stretcher. Physically safe environment: no spills, clutter or unnecessary equipment. Stretcher in lowest position, wheels locked, appropriate side rails in place. No

## 2023-01-16 NOTE — PROGRESS NOTE BEHAVIORAL HEALTH - NS ED [facility] MED ROS ENDOCRINE
No complaints Cheilitis Aggressive Treatment: I recommended application of Vaseline or Aquaphor numerous times a day (as often as every hour) and before going to bed. I also prescribed a topical steroid for twice daily use.

## 2023-08-07 NOTE — BEHAVIORAL HEALTH ASSESSMENT NOTE - NSHPLANGLIMITEDENGLISH_GEN_A_CORE
8/7/2023    Karol Beatty             1960        1. Diabetic?    []  Yes     [x]  No   [] Pills:      []  Insulin:    [] Diet Controlled - no medication    2. Blood thinners? []  Yes     [x] No       [] Aspirin    [] Pradaxa   [] Effient   [] NSAIDS   [] Xarelto   [] Aggrenox   [] Warfarin   [] Eliquis   [] Plavix   [] Lixiana    Why does the patient take blood thinners?   Name of Doctor that is monitoring:     3. MI or Stroke within 6 months?  [] Yes     [x] No    4. Narcotic Medications?  [] Yes     [x] No  Name of medication:     5. Anxiety Medications?  [] Yes     [x] No  Name of medication:      6. Do you take Phen-Phen? [] Yes  [x] No    7. Any bleeding disorders?  [] Yes     [x] No  If yes, what?    Name of Hematologist:  Current Medication for this disorder?   Medication given prior to procedure?   Have there been any problems with bleeding after surgical or dental procedures?  [] Yes     [] No  When:    What Happened:    8. Artificial Applications  [x] None  [] Artificial valve Type:   [] Pacemaker Type:  [] Defibrillator Type:  [] Stent Placement Type:  [] Joint Placement Type:  What:     When:  (If patient had joint placement surgery must wait 6 months to do procedure.)    9. Allergies?  [x] Yes     [] No  [x] Medications  [] Latex    10. Reaction to Anesthesia or previous endoscopies? [] Yes     [x] No  What:    11. Have you been diagnosed with sleep apnea?   [] Yes     [x] No  Do you have a CPAP machine? [] Yes     [] No    12.  Have you been diagnosed with COPD? [] Yes     [x] No   Do you have home oxygen? [] Yes     [] No    13. History of MRSA?  [] Yes   [x] No    14. History of drug or alcohol abuse?   [] Yes     [x] No    14. BMI checked  [x]   23.39  15.  When was your last colonoscopy? 12/2016    [place]: [phone] [facility]: [phone]    Reviewed by: Amelia Almeida LPN  8/7/2023             No

## 2025-06-06 NOTE — BEHAVIORAL HEALTH ASSESSMENT NOTE - NSBHCONSULTWRKUPYES_PSY_A_CORE
Laura Maldonado MD    Emergency Medicine, PGY1   EKG/monitor qtc